# Patient Record
Sex: FEMALE | Race: WHITE | Employment: FULL TIME | URBAN - METROPOLITAN AREA
[De-identification: names, ages, dates, MRNs, and addresses within clinical notes are randomized per-mention and may not be internally consistent; named-entity substitution may affect disease eponyms.]

---

## 2022-06-30 RX ORDER — MESALAMINE 4 G/60ML
4 ENEMA RECTAL AS NEEDED
COMMUNITY

## 2022-06-30 NOTE — PROGRESS NOTES
[facility] ENDOSCOPY COLONOSCOPY PRE-OPERATIVE INSTRUCTIONS    Procedure date_07/07/2022________Arrival time___1300_________        Surgery time____1400________       Clear liquids the day before the procedure. Do not eat or drink anything within 5 hours of your procedure. This includes water chewing gum, mints and ice chips. You may brush your teeth and gargle the morning of your surgery, but do not swallow the water    You may be asked to stop blood thinners such as Coumadin, Plavix, Fragmin, Lovenox, etc., or any anti-inflammatories such as:  Aspirin, Ibuprofen, Advil, Naproxen prior to your procedure. We also ask that you stop any OTC medications such as fish oil, vitamin E, glucosamine, garlic, Multivitamins, COQ 10, etc.    You must make arrangements for a responsible adult to arrive with you and stay in our waiting area during your procedure. They will also need to take you home after your procedure. For your safety you will not be allowed to leave alone or drive yourself home. Also for your safety, it is strongly suggested that someone stay with you the first 24 hours after your procedure. For your comfort, please wear simple loose fitting clothing to the center. Please do not bring valuables. If you have a living will and a durable power of  for healthcare, please bring in a copy.      You will need to bring a photo ID and insurance card    Our goal is to provide you with excellent care so if you have any questions, please contact us at the [facility] at [phone]         Please note these are generalized instructions for all colonoscopy cases, you may be provided with more specific instructions if necessary

## 2022-07-06 ENCOUNTER — ANESTHESIA EVENT (OUTPATIENT)
Dept: ENDOSCOPY | Age: 42
End: 2022-07-06
Payer: COMMERCIAL

## 2022-07-07 ENCOUNTER — HOSPITAL ENCOUNTER (OUTPATIENT)
Age: 42
Setting detail: OUTPATIENT SURGERY
Discharge: HOME | End: 2022-07-07
Attending: INTERNAL MEDICINE | Admitting: INTERNAL MEDICINE
Payer: COMMERCIAL

## 2022-07-07 ENCOUNTER — ANESTHESIA (OUTPATIENT)
Dept: ENDOSCOPY | Age: 42
End: 2022-07-07
Payer: COMMERCIAL

## 2022-07-07 VITALS
WEIGHT: 118 LBS | OXYGEN SATURATION: 100 % | DIASTOLIC BLOOD PRESSURE: 67 MMHG | RESPIRATION RATE: 14 BRPM | TEMPERATURE: 98.8 F | HEIGHT: 61 IN | HEART RATE: 92 BPM | BODY MASS INDEX: 22.28 KG/M2 | SYSTOLIC BLOOD PRESSURE: 110 MMHG

## 2022-07-07 DIAGNOSIS — K51.90 ULCERATIVE COLITIS WITHOUT COMPLICATIONS, UNSPECIFIED LOCATION (HCC): ICD-10-CM

## 2022-07-07 LAB — PREGNANCY, URINE: NEGATIVE

## 2022-07-07 PROCEDURE — 3700000000 HC ANESTHESIA ATTENDED CARE: Performed by: INTERNAL MEDICINE

## 2022-07-07 PROCEDURE — 3700000001 HC ADD 15 MINUTES (ANESTHESIA): Performed by: INTERNAL MEDICINE

## 2022-07-07 PROCEDURE — 2580000003 HC RX 258: Performed by: STUDENT IN AN ORGANIZED HEALTH CARE EDUCATION/TRAINING PROGRAM

## 2022-07-07 PROCEDURE — 88342 IMHCHEM/IMCYTCHM 1ST ANTB: CPT

## 2022-07-07 PROCEDURE — 2709999900 HC NON-CHARGEABLE SUPPLY: Performed by: INTERNAL MEDICINE

## 2022-07-07 PROCEDURE — 84703 CHORIONIC GONADOTROPIN ASSAY: CPT

## 2022-07-07 PROCEDURE — 7100000010 HC PHASE II RECOVERY - FIRST 15 MIN: Performed by: INTERNAL MEDICINE

## 2022-07-07 PROCEDURE — 7100000011 HC PHASE II RECOVERY - ADDTL 15 MIN: Performed by: INTERNAL MEDICINE

## 2022-07-07 PROCEDURE — 6360000002 HC RX W HCPCS

## 2022-07-07 PROCEDURE — 2500000003 HC RX 250 WO HCPCS

## 2022-07-07 PROCEDURE — 88305 TISSUE EXAM BY PATHOLOGIST: CPT

## 2022-07-07 PROCEDURE — 3609010600 HC COLONOSCOPY POLYPECTOMY SNARE/COLD BIOPSY: Performed by: INTERNAL MEDICINE

## 2022-07-07 RX ORDER — SODIUM CHLORIDE 9 MG/ML
INJECTION, SOLUTION INTRAVENOUS CONTINUOUS
Status: DISCONTINUED | OUTPATIENT
Start: 2022-07-07 | End: 2022-07-07 | Stop reason: HOSPADM

## 2022-07-07 RX ORDER — SODIUM CHLORIDE 9 MG/ML
INJECTION, SOLUTION INTRAVENOUS PRN
Status: DISCONTINUED | OUTPATIENT
Start: 2022-07-07 | End: 2022-07-07 | Stop reason: HOSPADM

## 2022-07-07 RX ORDER — PROPOFOL 10 MG/ML
INJECTION, EMULSION INTRAVENOUS PRN
Status: DISCONTINUED | OUTPATIENT
Start: 2022-07-07 | End: 2022-07-07 | Stop reason: SDUPTHER

## 2022-07-07 RX ORDER — LIDOCAINE HYDROCHLORIDE 20 MG/ML
INJECTION, SOLUTION EPIDURAL; INFILTRATION; INTRACAUDAL; PERINEURAL PRN
Status: DISCONTINUED | OUTPATIENT
Start: 2022-07-07 | End: 2022-07-07 | Stop reason: SDUPTHER

## 2022-07-07 RX ORDER — SODIUM CHLORIDE 0.9 % (FLUSH) 0.9 %
5-40 SYRINGE (ML) INJECTION PRN
Status: DISCONTINUED | OUTPATIENT
Start: 2022-07-07 | End: 2022-07-07 | Stop reason: HOSPADM

## 2022-07-07 RX ORDER — SODIUM CHLORIDE 0.9 % (FLUSH) 0.9 %
5-40 SYRINGE (ML) INJECTION EVERY 12 HOURS SCHEDULED
Status: DISCONTINUED | OUTPATIENT
Start: 2022-07-07 | End: 2022-07-07 | Stop reason: HOSPADM

## 2022-07-07 RX ORDER — PHENYLEPHRINE HCL IN 0.9% NACL 1 MG/10 ML
SYRINGE (ML) INTRAVENOUS PRN
Status: DISCONTINUED | OUTPATIENT
Start: 2022-07-07 | End: 2022-07-07 | Stop reason: SDUPTHER

## 2022-07-07 RX ADMIN — PROPOFOL 40 MG: 10 INJECTION, EMULSION INTRAVENOUS at 14:42

## 2022-07-07 RX ADMIN — PROPOFOL 20 MG: 10 INJECTION, EMULSION INTRAVENOUS at 14:21

## 2022-07-07 RX ADMIN — PROPOFOL 20 MG: 10 INJECTION, EMULSION INTRAVENOUS at 14:25

## 2022-07-07 RX ADMIN — SODIUM CHLORIDE: 9 INJECTION, SOLUTION INTRAVENOUS at 13:26

## 2022-07-07 RX ADMIN — PROPOFOL 100 MG: 10 INJECTION, EMULSION INTRAVENOUS at 14:14

## 2022-07-07 RX ADMIN — PROPOFOL 40 MG: 10 INJECTION, EMULSION INTRAVENOUS at 14:34

## 2022-07-07 RX ADMIN — Medication 200 MCG: at 14:40

## 2022-07-07 RX ADMIN — Medication 200 MCG: at 14:32

## 2022-07-07 RX ADMIN — LIDOCAINE HYDROCHLORIDE 50 MG: 20 INJECTION, SOLUTION EPIDURAL; INFILTRATION; INTRACAUDAL; PERINEURAL at 14:14

## 2022-07-07 RX ADMIN — PROPOFOL 20 MG: 10 INJECTION, EMULSION INTRAVENOUS at 14:23

## 2022-07-07 RX ADMIN — PROPOFOL 20 MG: 10 INJECTION, EMULSION INTRAVENOUS at 14:28

## 2022-07-07 RX ADMIN — PROPOFOL 20 MG: 10 INJECTION, EMULSION INTRAVENOUS at 14:18

## 2022-07-07 ASSESSMENT — PAIN - FUNCTIONAL ASSESSMENT: PAIN_FUNCTIONAL_ASSESSMENT: NONE - DENIES PAIN

## 2022-07-07 ASSESSMENT — ENCOUNTER SYMPTOMS: SHORTNESS OF BREATH: 0

## 2022-07-07 ASSESSMENT — PAIN SCALES - WONG BAKER
WONGBAKER_NUMERICALRESPONSE: 0
WONGBAKER_NUMERICALRESPONSE: 0

## 2022-07-07 NOTE — ANESTHESIA POSTPROCEDURE EVALUATION
Department of Anesthesiology  Postprocedure Note    Patient: Rosemary Wiggins  MRN: [id number]  YOB: 1980  Date of evaluation: 7/7/2022      Procedure Summary     Date: 07/07/22 Room / Location: [address]    Anesthesia Start: 2625 Anesthesia Stop: 1446    Procedure: COLONOSCOPY POLYPECTOMY SNARE/COLD BIOPSY (N/A ) Diagnosis:       Ulcerative colitis without complications, unspecified location ([facility]      (Ulcerative colitis without complications)    Surgeons: Xin Dougherty MD Responsible Provider: Georgina Iyer MD    Anesthesia Type: MAC ASA Status: 2          Anesthesia Type: No value filed. Danni Phase I: Danni Score: 10    Danni Phase II: Danni Score: 5      Anesthesia Post Evaluation    Patient location during evaluation: PACU  Level of consciousness: awake and alert  Airway patency: patent  Nausea & Vomiting: no nausea and no vomiting  Complications: no  Cardiovascular status: blood pressure returned to baseline  Respiratory status: acceptable  Hydration status: euvolemic  Comments: Postoperative Anesthesia Note    Name:    Rosemary Wiggins  MRN:      [id number]    Patient Vitals in the past 12 hrs:  07/07/22 1448, BP:(!) 81/47, Temp:98.8 °F (37.1 °C), Temp src:Temporal, Pulse:99, Resp:16, SpO2:98 %  07/07/22 1325, Pulse:(!) 109  07/07/22 1322, BP:120/80, Temp:98.9 °F (37.2 °C), Temp src:Temporal, Pulse:(!) 126, Resp:16, SpO2:100 %, Height:5' 1\" (1.549 m), Weight:118 lb (53.5 kg)     LABS:    CBC  No results found for: WBC, HGB, HCT, PLT  RENAL  No results found for: NA, K, CL, CO2, BUN, CREATININE, GLUCOSE  COAGS  No results found for: PROTIME, INR, APTT    Intake & Output:  @78PVXP@    Nausea & Vomiting:  No    Level of Consciousness:  Awake    Pain Assessment:  Adequate analgesia    Anesthesia Complications:  No apparent anesthetic complications    SUMMARY      Vital signs stable  OK to discharge from Stage I post anesthesia care. Care transferred from Anesthesiology department on discharge from perioperative area

## 2022-07-07 NOTE — H&P
[address]   Pre-operative History and Physical    Patient: Chelsea Birmingham  : 1980  Acct#:     HISTORY OF PRESENT ILLNESS:    The patient is a 36 y.o. female who presents for longstanding history of ulcerative colitis diagnosed . Currently on Rowasa enemas every other day. Never been on biologic therapy. Indications: Ulcerative colitis. Past Medical History:        Diagnosis Date    [facility]       Past Surgical History:        Procedure Laterality Date    COLONOSCOPY      WISDOM TOOTH EXTRACTION        Medications Prior to Admission:   No current facility-administered medications on file prior to encounter. Current Outpatient Medications on File Prior to Encounter   Medication Sig Dispense Refill    mesalamine (ROWASA) 4 g enema Place 4 g rectally as needed          Allergies:  Penicillins    Social History:   Social History     Socioeconomic History    Marital status:      Spouse name: Not on file    Number of children: Not on file    Years of education: Not on file    Highest education level: Not on file   Occupational History    Not on file   Tobacco Use    Smoking status: Unknown If Ever Smoked    Smokeless tobacco: Not on file   Substance and Sexual Activity    Alcohol use: Not on file    Drug use: Not on file    Sexual activity: Not on file   Other Topics Concern    Not on file   Social History Narrative    Not on file     Social Determinants of Health     Financial Resource Strain:     Difficulty of Paying Living Expenses: Not on file   Food Insecurity:     Worried About Running Out of Food in the Last Year: Not on file    Armida of Food in the Last Year: Not on file   Transportation Needs:     Lack of Transportation (Medical): Not on file    Lack of Transportation (Non-Medical):  Not on file   Physical Activity:     Days of Exercise per Week: Not on file    Minutes of Exercise per Session: Not on file   Stress:     Feeling of Stress : Not on file   Social Connections:     Frequency of Communication with Friends and Family: Not on file    Frequency of Social Gatherings with Friends and Family: Not on file    Attends Anabaptist Services: Not on file    Active Member of Clubs or Organizations: Not on file    Attends Club or Organization Meetings: Not on file    Marital Status: Not on file   Intimate Partner Violence:     Fear of Current or Ex-Partner: Not on file    Emotionally Abused: Not on file    Physically Abused: Not on file    Sexually Abused: Not on file   Housing Stability:     Unable to Pay for Housing in the Last Year: Not on file    Number of [place] in the Last Year: Not on file    Unstable Housing in the Last Year: Not on file      Family History:   History reviewed. No pertinent family history. PHYSICAL EXAM:      /80   Pulse (!) 109   Temp 98.9 °F (37.2 °C) (Temporal)   Resp 16   Ht 5' 1\" (1.549 m)   Wt 118 lb (53.5 kg)   SpO2 100%   BMI 22.30 kg/m²  I        Heart:  Normal apical impulse, regular rate and rhythm, normal S1 and S2, no S3 or S4, and no murmur noted    Lungs:  No increased work of breathing, good air exchange, clear to auscultation bilaterally, no crackles or wheezing    Abdomen:  No scars, normal bowel sounds, soft, non-distended, non-tender, no masses palpated, no hepatosplenomegally      ASA Class  ASA 2 - Patient with mild systemic disease with no functional limitations    Mallampati Class: 3      ASSESSMENT AND PLAN:    1. Patient is a suitable candidate for endoscopic procedure and attendant anesthesia  2. Risks, benefits, alternatives of procedure discussed in detail with patient including risks of bleeding, infection, perforation, risks of sedation, risks of missed lesions. The patient wishes to proceed.

## 2022-07-07 NOTE — PROCEDURES
Colonoscopy Procedure  Note          Patient: Dev Ortega  : 1980      Procedure: Colonoscopy with cold snare polypectomy, biopsies    Date:  2022    Primary Care Physician: [id number]     Operative surgeon: Sreedhar Mcghee MD  Previous Colonoscopy: Yes  Consent: I explained and discussed the risk, benefits and alternatives for the procedure with the patient and obtained the patient's consent for the procedure. We discussed the specific risks including bleeding, perforation, post-procedure abdominal pain, and missed lesions which could lead to interval colorectal cancers. History:       Past Medical History:   Diagnosis Date    Ulcerative colitis ([facility]       Preoperative Diagnosis: Ulcerative colitis without complications  Post Operative Diagnosis: Ulcerative proctitis, cecal patch, colon polyp  ASA: 2  SEDATION: MAC      Procedures Performed: Colonoscopy   Scope Type: Pediatric    Procedure Details:      With the patient in left lateral decubitus position the endoscope was inserted through the anorectal area into the rectum. The scope was then advanced through the length of the colon to the cecum and terminal ileum. The quality of preparation was good. The scope was carefully withdrawn with careful inspection. Images were taken of the multiple segments of colon, cecum, IC valve, rectum, terminal ileum. Retroflexion was preformed in the rectum. Cecum Intubated: Yes  EBL: minimal to none  Complications:  no complications were noted  Post-operative Findings: Perianal exam unremarkable, digital rectal exam unremarkable, retroflexion the rectum did not demonstrate significant hemorrhoids    Upon entering the rectum there was Cordova 2 inflammation involving the entirety of the rectum.   Biopsies were taken to assess and to rule out dysplasia    The sigmoid descending transverse ascending colon were normal.  There was a cecal patch cordova class 1 inflammation which extended around the IC valve. In the cecum in close proximity to the IC valve there was a 7 mm polyp that was removed with cold snare resection interval complete. This did appear to be polyp with margins which were seen with NBI. The surrounding mucosa had minimal erythema and no signs of dysplasia. Biopsies were taken every 10 cm for dysplasia. Jars were  as cecum and ascending, transverse, descending and sigmoid, and rectum. Each segment was reviewed with narrowband imaging no obvious dysplastic lesions were seen. The terminal ileum was intubated was normal      Plan: Ulcerative proctitis majority of her colon is otherwise normal outside of a cecal patch. She has rather minimal symptoms. Although in comparison to her other colonoscopies from 2016 20 and 21 this appears to be slightly more significant disease. She is taking Rowasa enemas every other day. Fecal calprotectin was 152. We will consider adding canasa suppositories if she is not willing to do the enemas daily. Signed By: MD Ruth Bingham MD,   [address]   7/7/2022      Please note that some or all of this record was generated using voice recognition software. If there are any questions about the content of this document, please contact the author as some errors in translation may have occurred.

## 2022-07-07 NOTE — ANESTHESIA PRE PROCEDURE
[employer] Department of Anesthesiology  Pre-Anesthesia Evaluation/Consultation       Name:  Carlos Eli  : 1980  Age:  36 y.o. MRN:  [id number]  Date: 2022           Surgeon: Surgeon(s):  Cheko Sheth MD    Procedure: Procedure(s):  COLONOSCOPY DIAGNOSTIC     Allergies   Allergen Reactions    Penicillins Rash     There is no problem list on file for this patient. Past Medical History:   Diagnosis Date    Ulcerative colitis ([facility]      Past Surgical History:   Procedure Laterality Date    COLONOSCOPY      WISDOM TOOTH EXTRACTION       Social History     Tobacco Use    Smoking status: Unknown If Ever Smoked    Smokeless tobacco: Not on file   Substance Use Topics    Alcohol use: Not on file    Drug use: Not on file     Medications  No current facility-administered medications on file prior to encounter. Current Outpatient Medications on File Prior to Encounter   Medication Sig Dispense Refill    mesalamine (ROWASA) 4 g enema Place 4 g rectally as needed       No current facility-administered medications for this encounter. Current Outpatient Medications   Medication Sig Dispense Refill    mesalamine (ROWASA) 4 g enema Place 4 g rectally as needed       Vital Signs (Current)   Vitals:    22 0943   Weight: 120 lb (54.4 kg)   Height: 5' 1\" (1.549 m)                                            Vital Signs Statistics (for past 48 hrs)     No data recorded  BP Readings from Last 3 Encounters:   No data found for BP       BMI  Body mass index is 22.67 kg/m². Estimated body mass index is 22.67 kg/m² as calculated from the following:    Height as of this encounter: 5' 1\" (1.549 m). Weight as of this encounter: 120 lb (54.4 kg).     CBC No results found for: WBC, RBC, HGB, HCT, MCV, RDW, PLT  CMP  No results found for: NA, K, CL, CO2, BUN, CREATININE, GFRAA, AGRATIO, LABGLOM, GLUCOSE, GLU, PROT, CALCIUM, BILITOT, ALKPHOS, AST, ALT  BMP  No results found for: NA, K, CL, CO2, BUN, CREATININE, CALCIUM, GFRAA, LABGLOM, GLUCOSE, GLU  POCGlucose  No results for input(s): GLUCOSE in the last 72 hours. Coags  No results found for: PROTIME, INR, APTT  HCG (If Applicable) No results found for: PREGTESTUR, PREGSERUM, HCG, HCGQUANT   ABGs No results found for: PHART, PO2ART, QNS5YGK, FGB7VDU, BEART, M2SDBYSR   Type & Screen (If Applicable)  No results found for: LABABO, LABRH                         BMI: Wt Readings from Last 3 Encounters:       NPO Status:                          Anesthesia Evaluation  Patient summary reviewed and Nursing notes reviewed  Airway: Mallampati: II          Dental:          Pulmonary:       (-) COPD, asthma and shortness of breath                           Cardiovascular:        (-) hypertension, valvular problems/murmurs, past MI, CAD, CABG/stent, dysrhythmias,  angina and no hyperlipidemia                Neuro/Psych:      (-) seizures, TIA and CVA           GI/Hepatic/Renal:        (-) GERD, PUD, liver disease and no renal disease      ROS comment: UC.   Endo/Other:        (-) diabetes mellitus               Abdominal:             Vascular: negative vascular ROS. Other Findings:           Anesthesia Plan      MAC     ASA 2     (I discussed intravenous sedation to the patient's satisfaction including risks and alternatives. The patient agreed with the plan and has no further questions. Carter Jacob MD )  Induction: intravenous. Anesthetic plan and risks discussed with patient. Plan discussed with CRNA. This pre-anesthesia assessment may be used as a history and physical.    DOS STAFF ADDENDUM:    Pt seen and examined, chart reviewed (including anesthesia, drug and allergy history). No interval changes to history and physical examination. Anesthetic plan, risks, benefits, alternatives, and personnel involved discussed with patient.   Patient verbalized an understanding and agrees to proceed.       Gen Escobar MD  July 7, 2022  12:11 PM

## 2023-02-06 ENCOUNTER — HOSPITAL ENCOUNTER (OUTPATIENT)
Dept: PHYSICAL THERAPY | Age: 43
Setting detail: THERAPIES SERIES
Discharge: HOME | End: 2023-02-06
Payer: COMMERCIAL

## 2023-02-06 PROCEDURE — 97110 THERAPEUTIC EXERCISES: CPT

## 2023-02-06 PROCEDURE — 97161 PT EVAL LOW COMPLEX 20 MIN: CPT

## 2023-02-06 NOTE — PLAN OF CARE
Richar Chapman  Phone: [phone]   Fax:     [phone]                                                       Physical Therapy Certification    Dear Jesu Burdick MD,    We had the pleasure of evaluating the following patient for physical therapy services at [address] [place] [address]. A summary of our findings can be found in the initial assessment below. This includes our plan of care. If you have any questions or concerns regarding these findings, please do not hesitate to contact me at the office phone number checked above. Thank you for the referral.       Physician Signature:_______________________________Date:__________________  By signing above (or electronic signature), therapists plan is approved by physician      Patient: Maria Dolores Zavala   : 1980   MRN: [id number]  Referring Physician: Jesu Burdick MD      Evaluation Date: 2023     Medical Diagnosis Information:  Diagnosis: s/p R knee sx 2/3/2023   Treatment Diagnosis: M25.561, M25.661, M62.81                                         Insurance information: PT Insurance Information: [facility]; 20 visits/year; no auth; no ded; OOP not met     Precautions/ Contra-indications: s/p R ACLr w/ quad tendon autograft, DOS: 2/3/2023  Latex Allergy:  [x]NO      []YES  Preferred Language for Healthcare:   [x]English       []other:    C-SSRS Triggered by Intake questionnaire (Past 2 wk assessment ):   [x] No, Questionnaire did not trigger screening.   [] Yes, Patient intake triggered C-SSRS Screening      [] C-SSRS Screening completed  [] PCP notified via Epic     SUBJECTIVE: Patient stated complaint: Pt presents today for evaluation of R knee 3 days s/p R ACLr w/ quad tendon autograft on 2/3/2023.  Pt has been compliant w/ TTWB restrictions w/ brace locked in extension and use of axillary crutches and keeping compression brace. Aware of signs/symptoms of infection. Has been compliant w/ HEP given from doctor. Has familial support at home for transportation and transfers. Follow up on 2/16/2023. Pt denies previous injuries to R leg. Relevant Medical History:n/a  Functional Scale/Score: LEFS: 13     Pain Scale: 0-8/10  Easing factors: pain meds PRN (2x today); aspirin 1x/day   Provocative factors: sitting w/ leg straight      Type: []Constant   [x]Intermittent  []Radiating [x]Localized []other:     Numbness/Tingling: pt denies    Occupation/School: nurse- off work for 6 weeks    Living Status/Prior Level of Function: Independent with ADLs and IADLs    OBJECTIVE:     ROM LEFT RIGHT   HIP Flex     HIP Abd     HIP Ext     HIP IR     HIP ER     Knee ext 0 0-7   Knee Flex 140 90   Ankle PF     Ankle DF     Ankle In     Ankle Ev     Strength  LEFT RIGHT   HIP Flexors     HIP Abductors     HIP Ext     Hip ER     Knee EXT (quad)  trace   Knee Flex (HS)     Ankle DF     Ankle PF     Ankle Inv     Ankle EV          Circumference  Mid apex  7 cm prox             Reflexes/Sensation:    [x]Dermatomes/Myotomes intact    [x]Reflexes equal and normal bilaterally   []Other:    Joint mobility:    []Normal    [x]Hypo   []Hyper    Palpation: n/a    Functional Mobility/Transfers: n/a    Posture: WNL    Bandages/Dressings/Incisions: healing appropriately; no evidence of drainage or infection    Gait: (include devices/WB status) TTWB, brace locked in extension w/ crutches    Orthopedic Special Tests: n/a                       [x] Patient history, allergies, meds reviewed. Medical chart reviewed. See intake form. Review Of Systems (ROS):  [x]Performed Review of systems (Integumentary, CardioPulmonary, Neurological) by intake and observation. Intake form has been scanned into medical record.  Patient has been instructed to contact their primary care physician regarding ROS issues if not already being addressed at this time. Co-morbidities/Complexities (which will affect course of rehabilitation):   []None           Arthritic conditions   []Rheumatoid arthritis (M05.9)  []Osteoarthritis (M19.91)   Cardiovascular conditions   []Hypertension (I10)  []Hyperlipidemia (E78.5)  []Angina pectoris (I20)  []Atherosclerosis (I70)  []CVA Musculoskeletal conditions   []Disc pathology   []Congenital spine pathologies   []Prior surgical intervention  []Osteoporosis (M81.8)  []Osteopenia (M85.8)   Endocrine conditions   []Hypothyroid (E03.9)  []Hyperthyroid Gastrointestinal conditions   []Constipation (E98.34)   Metabolic conditions   []Morbid obesity (E66.01)  []Diabetes type 1(E10.65) or 2 (E11.65)   []Neuropathy (G60.9)     Pulmonary conditions   []Asthma (J45)  []Coughing   []COPD (J44.9)   Psychological Disorders  []Anxiety (F41.9)  []Depression (F32.9)   []Other:   []Other:          Barriers to/and or personal factors that will affect rehab potential:              []Age  []Sex    []Smoker              []Motivation/Lack of Motivation                        []Co-Morbidities              []Cognitive Function, education/learning barriers              []Environmental, home barriers              []profession/work barriers  []past PT/medical experience  []other:  Justification:     Falls Risk Assessment (30 days):   [x] Falls Risk assessed and no intervention required. [] Falls Risk assessed and Patient requires intervention due to being higher risk   TUG score (>12s at risk):     [] Falls education provided, including         ASSESSMENT: Focus on ROM for 0-120 deg by 4-6 weeks postop. Progress weightbearing 25%/week as appropriate. Modalities and exercises targeting full independence w/ quad recruitment for safe progression of weight bearing and strengthening.   Functional Impairments:     [x]Noted lumbar/proximal hip/LE hypomobility   [x]Decreased LE functional ROM   [x]Decreased core/proximal hip strength and neuromuscular control   [x]Decreased LE functional strength   [x]Reduced balance/proprioceptive control   []other:      Functional Activity Limitations (from functional questionnaire and intake)   []Reduced ability to tolerate prolonged functional positions   [x]Reduced ability or difficulty with changes of positions or transfers between positions   [x]Reduced ability to maintain good posture and demonstrate good body mechanics with sitting, bending, and lifting   []Reduced ability to sleep   [x] Reduced ability or tolerance with driving and/or computer work   [x]Reduced ability to perform lifting, carrying tasks   [x]Reduced ability to squat   []Reduced ability to forward bend   [x]Reduced ability to ambulate prolonged functional periods/distances/surfaces   [x]Reduced ability to ascend/descend stairs   [x]Reduced ability to run, hop or jump   []other:     Participation Restrictions   [x]Reduced participation in self care activities   [x]Reduced participation in home management activities   [x]Reduced participation in work activities   []Reduced participation in social activities. [x]Reduced participation in sport activities. Classification :    [x]Signs/symptoms consistent with post-surgical status including decreased ROM, strength and function.    []Signs/symptoms consistent with joint sprain/strain   []Signs/symptoms consistent with patella-femoral syndrome   []Signs/symptoms consistent with knee OA/hip OA   []Signs/symptoms consistent with internal derangement of knee/Hip   []Signs/symptoms consistent with functional hip weakness/NMR control      []Signs/symptoms consistent with tendinitis/tendinosis    []signs/symptoms consistent with pathology which may benefit from Dry needling      []other:      Prognosis/Rehab Potential:      [x]Excellent   []Good    []Fair   []Poor    Tolerance of evaluation/treatment:    [x]Excellent   []Good    []Fair   []Poor    Physical Therapy Evaluation Complexity Justification  [x] A history of present problem with:  [x] no personal factors and/or comorbidities that impact the plan of care;  []1-2 personal factors and/or comorbidities that impact the plan of care  []3 personal factors and/or comorbidities that impact the plan of care  [x] An examination of body systems using standardized tests and measures addressing any of the following: body structures and functions (impairments), activity limitations, and/or participation restrictions;:  [x] a total of 1-2 or more elements   [] a total of 3 or more elements   [] a total of 4 or more elements   [x] A clinical presentation with:  [x] stable and/or uncomplicated characteristics   [] evolving clinical presentation with changing characteristics  [] unstable and unpredictable characteristics;   [x] Clinical decision making of [x] low, [] moderate, [] high complexity using standardized patient assessment instrument and/or measurable assessment of functional outcome. [x] EVAL (LOW) 17922 (typically 20 minutes face-to-face)  [] EVAL (MOD) 47685 (typically 30 minutes face-to-face)  [] EVAL (HIGH) 82095 (typically 45 minutes face-to-face)  [] RE-EVAL     PLAN:  Frequency/Duration:  2 days per week for 8 Weeks:  Interventions:  [x]  Therapeutic exercise including: strength training, ROM, for Lower extremity and core   [x]  NMR activation and proprioception for LE, Glutes and Core   [x]  Manual therapy as indicated for LE, Hip and spine to include: Dry Needling/IASTM, STM, PROM, Gr I-IV mobilizations, manipulation. [x] Modalities as needed that may include: thermal agents, E-stim, Biofeedback, US, iontophoresis as indicated  [x] Patient education on joint protection, postural re-education, activity modification, progression of HEP.     HEP instruction: (see scanned forms)    GOALS:  Patient stated goal: \"get back to working out, skiing, working\"  [] Progressing: [] Met: [x] Not Met: [] Adjusted    Therapist goals for Patient: Short Term Goals: To be achieved in: 2 weeks  1. Independent in HEP and progression per patient tolerance, in order to prevent re-injury. [] Progressing: [] Met: [x] Not Met: [] Adjusted  2. Patient will have a decrease in pain to facilitate improvement in movement, function, and ADLs as indicated by Functional Deficits. [] Progressing: [] Met: [x] Not Met: [] Adjusted    Long Term Goals: To be achieved in: 12 weeks  1. Disability index score of 70 or higher for the LEFS to assist with reaching prior level of function. [] Progressing: [] Met: [x] Not Met: [] Adjusted  2. Patient will demonstrate increased AROM to full R knee flexion and extension to allow for proper joint functioning as indicated by patients Functional Deficits. [] Progressing: [] Met: [x] Not Met: [] Adjusted  3. Patient will demonstrate an increase in Strength to good proximal hip strength and control, within 5lb HHD in LE to allow for proper functional mobility as indicated by patients Functional Deficits. [] Progressing: [] Met: [x] Not Met: [] Adjusted  4. Patient will return to walking without increased symptoms or restrictions and without functional activities without increased symptoms or restriction. [] Progressing: [] Met: [x] Not Met: [] Adjusted  5. Pt will be able to return to work without increased symptoms or duty restrictions.   [] Progressing: [] Met: [x] Not Met: [] Adjusted     Electronically signed by:  Haseeb Montes, PT

## 2023-02-07 NOTE — FLOWSHEET NOTE
[facility]  [address] [place] Howell  Phone: [phone] Fax: [phone]    Physical Therapy Treatment Note/ Progress Report:       Date:  2023    Patient Name:  Hal Andrew    :  1980  MRN: [id number]  Restrictions/Precautions:    Medical/Treatment Diagnosis Information:  Diagnosis: s/p R knee sx 2/3/2023  Treatment Diagnosis: M25.561, M25.661, Z25.09  Insurance/Certification information:  PT Insurance Information: [facility]; 20 visits/year; no auth; no ded; OOP not met  Physician Information:  Lindsey Pretty MD  Plan of care signed (Y/N):     Date of Patient follow up with Physician:      Progress Report: []  Yes  [x]  No     Date Range for reporting period:  Beginnin2023  Ending: 10 visits or 30 days    Progress report due (10 Rx/or 30 days whichever is less): 2664     Recertification due (POC duration/ or 90 days whichever is less): 3/8/2023     Visit # Insurance Allowable Auth Needed   1 20 []Yes    [x]No     Latex Allergy:  [x]NO      []YES  Preferred Language for Healthcare:   [x]English       []other:  Functional Scale: LEFS:    Date assessed:2023    Pain level:  0-8/10     SUBJECTIVE:  See eval    OBJECTIVE: See eval  Observation:   Test measurements:      RESTRICTIONS/PRECAUTIONS:  s/p R ACLr w/ quad tendon autograft, DOS: 2/3/2023    Exercises/Interventions:     Therapeutic Ex (89419)   Min: 25 Sets/sec Reps Notes/CUES   Retro Stepper/BIKE      Seated HS stretch 30'' 3    Strap calf stretch 30'' 3    Quad set 10'' 10    Heel slide 10'' 10    Heel prop 3'     Alter G      BFR      Sportcord March      3 way SLR      SAQ      Clam ABD      Hip Ext Polly Life      BOSU fwd/side lunge      BOSU squat      Leg Press Iso/Con/Ecc 0-      Cybex HS curl      TKE      Glute side walks      RDL      Slide Lunge      Slide HS eccentrics      Step ups/ecc step down      Swissball wall rolls- in SLS- hip drive      Quad hip ext/wall-ball rolls                  Manual Intervention (45296)  Min:      Knee mobs/PROM 6'     Tib/Fem Mobs      Patella Mobs 3'  gentle   Ankle mobs                  NMR re-education (46658)  Min:   CUES NEEDED   Slovak/Biofeedback 10/10 8'  Quad set; 2 channel Gabonese stim   BFR      G. Med activation      Hip Ext full ROM/ G. Activation      Bosu Bal and Prop- G Med      Single leg stance/Balance/Prop      Bosu Retro G. Med act      Prone Hip froggers- sliders/elevated            Therapeutic Activity (12661)  Min:      Ladders      Plyos      Dynamic Balance                            Therapeutic Exercise and NMR EXR  [x] (66469) Provided verbal/tactile cueing for activities related to strengthening, flexibility, endurance, ROM for improvements in LE, proximal hip, and core control with self care, mobility, lifting, ambulation. [x] (20758) Provided verbal/tactile cueing for activities related to improving balance, coordination, kinesthetic sense, posture, motor skill, proprioception  to assist with LE, proximal hip, and core control in self care, mobility, lifting, ambulation and eccentric single leg control.      NMR and Therapeutic Activities:    [x] (56644 or 15428) Provided verbal/tactile cueing for activities related to improving balance, coordination, kinesthetic sense, posture, motor skill, proprioception and motor activation to allow for proper function of core, proximal hip and LE with self care and ADLs and functional mobility.   [] (94055) Gait Re-education- Provided training and instruction to the patient for proper LE, core and proximal hip recruitment and positioning and eccentric body weight control with ambulation re-education including up and down stairs     Home Exercise Program:    [x] (65718) Reviewed/Progressed HEP activities related to strengthening, flexibility, endurance, ROM of core, proximal hip and LE for functional self-care, mobility, lifting and ambulation/stair navigation   [] (78338)Reviewed/Progressed HEP activities related to improving balance, coordination, kinesthetic sense, posture, motor skill, proprioception of core, proximal hip and LE for self care, mobility, lifting, and ambulation/stair navigation      Manual Treatments:  PROM / STM / Oscillations-Mobs:  G-I, II, III, IV (PA's, Inf., Post.)  [x] (17432) Provided manual therapy to mobilize LE, proximal hip and/or LS spine soft tissue/joints for the purpose of modulating pain, promoting relaxation,  increasing ROM, reducing/eliminating soft tissue swelling/inflammation/restriction, improving soft tissue extensibility and allowing for proper ROM for normal function with self care, mobility, lifting and ambulation. Modalities:     [x] GAME READY (VASO)- for significant edema, swelling, pain control. Charges:  Timed Code Treatment Minutes: 30   Total Treatment Minutes: 60      [x] EVAL (LOW) 37617 (typically 20 minutes face-to-face)  [] EVAL (MOD) 59214 (typically 30 minutes face-to-face)  [] EVAL (HIGH) 04096 (typically 45 minutes face-to-face)  [] RE-EVAL     [x] HL(72422) x  1   [] DRY NEEDLE 1 OR 2 MUSCLES  [] NMR (28680) x     [] DRY NEEDLE 3+ MUSCLES  [] Manual (82179) x       [] TA (98872) x     [] Mech Traction (61252)  [] ES(attended) (74841)     [] ES (un) (39337):   [] VASO (00165)  [] Other:      GOALS:  Patient stated goal: \"get back to working out, skiing, working\"  [] Progressing: [] Met: [x] Not Met: [] Adjusted    Therapist goals for Patient:   Short Term Goals: To be achieved in: 2 weeks  1. Independent in HEP and progression per patient tolerance, in order to prevent re-injury. [] Progressing: [] Met: [x] Not Met: [] Adjusted  2. Patient will have a decrease in pain to facilitate improvement in movement, function, and ADLs as indicated by Functional Deficits. [] Progressing: [] Met: [x] Not Met: [] Adjusted    Long Term Goals: To be achieved in: 12 weeks  1.  Disability index score of 70 or higher for the LEFS to assist with reaching prior level of function. [] Progressing: [] Met: [x] Not Met: [] Adjusted  2. Patient will demonstrate increased AROM to full R knee flexion and extension to allow for proper joint functioning as indicated by patients Functional Deficits. [] Progressing: [] Met: [x] Not Met: [] Adjusted  3. Patient will demonstrate an increase in Strength to good proximal hip strength and control, within 5lb HHD in LE to allow for proper functional mobility as indicated by patients Functional Deficits. [] Progressing: [] Met: [x] Not Met: [] Adjusted  4. Patient will return to walking without increased symptoms or restrictions and without functional activities without increased symptoms or restriction. [] Progressing: [] Met: [x] Not Met: [] Adjusted  5. Pt will be able to return to work without increased symptoms or duty restrictions. [] Progressing: [] Met: [x] Not Met: [] Adjusted     ASSESSMENT:  See eval    Return to Play: (if applicable)   [x]  Stage 1: Intro to Strength   []  Stage 2: Return to Run and Strength   []  Stage 3: Return to Jump and Strength   []  Stage 4: Dynamic Strength and Agility   []  Stage 5: Sport Specific Training     []  Ready to Return to Play, Meets All Above Stages   []  Not Ready for Return to Sports   Comments:            Treatment/Activity Tolerance:  [x] Patient tolerated treatment well [] Patient limited by fatique  [] Patient limited by pain  [] Patient limited by other medical complications  [] Other:     Overall Progression Towards Functional goals/ Treatment Progress Update:  [] Patient is progressing as expected towards functional goals listed. [] Progression is slowed due to complexities/Impairments listed. [] Progression has been slowed due to co-morbidities.   [x] Plan just implemented, too soon to assess goals progression <30days   [] Goals require adjustment due to lack of progress  [] Patient is not progressing as expected and requires additional follow up with physician  [] Other    Prognosis for POC: [x] Good [] Fair  [] Poor    Patient requires continued skilled intervention: [x] Yes  [] No        PLAN: See eval  [] Continue per plan of care [] Alter current plan (see comments)  [x] Plan of care initiated [] Hold pending MD visit [] Discharge    Electronically signed by: Rafal Woo PT    Note: If patient does not return for scheduled/recommended follow up visits, this note will serve as a discharge from care along with the most recent update on progress.

## 2023-02-09 ENCOUNTER — HOSPITAL ENCOUNTER (OUTPATIENT)
Dept: PHYSICAL THERAPY | Age: 43
Setting detail: THERAPIES SERIES
Discharge: HOME | End: 2023-02-09
Payer: COMMERCIAL

## 2023-02-09 PROCEDURE — 97112 NEUROMUSCULAR REEDUCATION: CPT

## 2023-02-09 PROCEDURE — 97110 THERAPEUTIC EXERCISES: CPT

## 2023-02-09 PROCEDURE — 97140 MANUAL THERAPY 1/> REGIONS: CPT

## 2023-02-09 NOTE — FLOWSHEET NOTE
[place] [address] [facility] [address]  Phone: [phone] Fax: [phone]    Physical Therapy Treatment Note/ Progress Report:       Date:  2023    Patient Name:  Ivette Nguyen    :  1980  MRN: [id number]  Restrictions/Precautions:    Medical/Treatment Diagnosis Information:  Diagnosis: s/p R knee sx 2/3/2023  Treatment Diagnosis: M25.561, M25.661, C20.92  Insurance/Certification information:  PT Insurance Information: [facility]; 20 visits/year; no auth; no ded; OOP not met  Physician Information:  Jennifer Reed MD  Plan of care signed (Y/N):     Date of Patient follow up with Physician:      Progress Report: []  Yes  [x]  No     Date Range for reporting period:  Beginnin2023  Ending: 10 visits or 30 days    Progress report due (10 Rx/or 30 days whichever is less): 336     Recertification due (POC duration/ or 90 days whichever is less): 3/8/2023     Visit # Insurance Allowable Auth Needed   2 20 []Yes    [x]No     Latex Allergy:  [x]NO      []YES  Preferred Language for Healthcare:   [x]English       []other:  Functional Scale: LEFS:    Date assessed:2023    Pain level:  0-6/10     SUBJECTIVE:  Pt reports that she was up too much yesterday and is feeling a little more sore from that. Pt notes having difficulty getting her quad to fire with quad sets. HEP is going well.        OBJECTIVE: See eval  Observation:   Test measurements:      RESTRICTIONS/PRECAUTIONS:  s/p R ACLr w/ quad tendon autograft, DOS: 2/3/2023    Exercises/Interventions:     Therapeutic Ex (13130)   Min: 20 Sets/sec Reps Notes/CUES   Retro Stepper/BIKE      Seated HS stretch 30'' 3    Strap calf stretch 30'' 3    Quad set 10'' 10 Into towel roll   Heel slide 10'' 10    Heel prop 3'     Alter G      BFR      Sportcord March      3 way SLR      SAQ      Clam ABD      Hip Ext Eduarda Андрей      BOSU fwd/side lunge      BOSU squat      Leg Press Iso/Con/Ecc 0-      Cybex HS curl      TKE      Glute side walks      RDL      Slide Lunge      Slide HS eccentrics      Step ups/ecc step down      Swissball wall rolls- in SLS- hip drive      Quad hip ext/wall-ball rolls                  Manual Intervention (90639)  Min: 11      Knee mobs/PROM 4'     Tib/Fem Mobs      Patella Mobs 3'  gentle   Ankle mobs      STM 4  Proximal lateral calf          NMR re-education (42882)  Min: 10 min   CUES NEEDED   VMS burst 10/10 8'  Quad set with Pt in long-sitting   BFR      G. Med activation      Hip Ext full ROM/ G. Activation      Bosu Bal and Prop- G Med      Single leg stance/Balance/Prop      Bosu Retro G. Med act      Prone Hip froggers- sliders/elevated            Therapeutic Activity (79589)  Min:      Ladders      Plyos      Dynamic Balance                            Therapeutic Exercise and NMR EXR  [x] (98259) Provided verbal/tactile cueing for activities related to strengthening, flexibility, endurance, ROM for improvements in LE, proximal hip, and core control with self care, mobility, lifting, ambulation. [x] (58895) Provided verbal/tactile cueing for activities related to improving balance, coordination, kinesthetic sense, posture, motor skill, proprioception  to assist with LE, proximal hip, and core control in self care, mobility, lifting, ambulation and eccentric single leg control.      NMR and Therapeutic Activities:    [x] (01139 or 58716) Provided verbal/tactile cueing for activities related to improving balance, coordination, kinesthetic sense, posture, motor skill, proprioception and motor activation to allow for proper function of core, proximal hip and LE with self care and ADLs and functional mobility.   [] (11559) Gait Re-education- Provided training and instruction to the patient for proper LE, core and proximal hip recruitment and positioning and eccentric body weight control with ambulation re-education including up and down stairs Home Exercise Program:    [x] (94869) Reviewed/Progressed HEP activities related to strengthening, flexibility, endurance, ROM of core, proximal hip and LE for functional self-care, mobility, lifting and ambulation/stair navigation   [] (58632)Reviewed/Progressed HEP activities related to improving balance, coordination, kinesthetic sense, posture, motor skill, proprioception of core, proximal hip and LE for self care, mobility, lifting, and ambulation/stair navigation      Manual Treatments:  PROM / STM / Oscillations-Mobs:  G-I, II, III, IV (PA's, Inf., Post.)  [x] (19764) Provided manual therapy to mobilize LE, proximal hip and/or LS spine soft tissue/joints for the purpose of modulating pain, promoting relaxation,  increasing ROM, reducing/eliminating soft tissue swelling/inflammation/restriction, improving soft tissue extensibility and allowing for proper ROM for normal function with self care, mobility, lifting and ambulation. Modalities:     [x] GAME READY (VASO)- for significant edema, swelling, pain control x 10 min @ conclusion with pillowcase barrier placed and LE elevated on 1 pillow. Charges:  Timed Code Treatment Minutes: 41   Total Treatment Minutes: 51      [] EVAL (LOW) 52929 (typically 20 minutes face-to-face)  [] EVAL (MOD) 33142 (typically 30 minutes face-to-face)  [] EVAL (HIGH) 24483 (typically 45 minutes face-to-face)  [] RE-EVAL     [x] ZN(04604) x  1   [] DRY NEEDLE 1 OR 2 MUSCLES  [x] NMR (25966) x 1    [] DRY NEEDLE 3+ MUSCLES  [x] Manual (23764) x 1      [] TA (70658) x     [] [facility] (37102)  [] ES(attended) (49691)     [] ES (un) (65864):   [] VASO (95645)  [] Other:      GOALS:  Patient stated goal: \"get back to working out, skiing, working\"  [] Progressing: [] Met: [x] Not Met: [] Adjusted    Therapist goals for Patient:   Short Term Goals: To be achieved in: 2 weeks  1. Independent in HEP and progression per patient tolerance, in order to prevent re-injury.    [] Progressing: [] Met: [x] Not Met: [] Adjusted  2. Patient will have a decrease in pain to facilitate improvement in movement, function, and ADLs as indicated by Functional Deficits. [] Progressing: [] Met: [x] Not Met: [] Adjusted    Long Term Goals: To be achieved in: 12 weeks  1. Disability index score of 70 or higher for the LEFS to assist with reaching prior level of function. [] Progressing: [] Met: [x] Not Met: [] Adjusted  2. Patient will demonstrate increased AROM to full R knee flexion and extension to allow for proper joint functioning as indicated by patients Functional Deficits. [] Progressing: [] Met: [x] Not Met: [] Adjusted  3. Patient will demonstrate an increase in Strength to good proximal hip strength and control, within 5lb HHD in LE to allow for proper functional mobility as indicated by patients Functional Deficits. [] Progressing: [] Met: [x] Not Met: [] Adjusted  4. Patient will return to walking without increased symptoms or restrictions and without functional activities without increased symptoms or restriction. [] Progressing: [] Met: [x] Not Met: [] Adjusted  5. Pt will be able to return to work without increased symptoms or duty restrictions. [] Progressing: [] Met: [x] Not Met: [] Adjusted     ASSESSMENT:  Good session. Pain well controlled for Pt not yet being a full week out of surgery. Quad activation needs work as expected with acute phase after surgery. Swelling is fairly well controlled. ROM doing well per protocol.      Return to Play: (if applicable)   [x]  Stage 1: Intro to Strength   []  Stage 2: Return to Run and Strength   []  Stage 3: Return to Jump and Strength   []  Stage 4: Dynamic Strength and Agility   []  Stage 5: Sport Specific Training     []  Ready to Return to Play, Meets All Above Stages   []  Not Ready for Return to Sports   Comments:            Treatment/Activity Tolerance:  [x] Patient tolerated treatment well [] Patient limited by ivania  [] Patient limited by pain  [] Patient limited by other medical complications  [] Other:     Overall Progression Towards Functional goals/ Treatment Progress Update:  [] Patient is progressing as expected towards functional goals listed. [] Progression is slowed due to complexities/Impairments listed. [] Progression has been slowed due to co-morbidities. [x] Plan just implemented, too soon to assess goals progression <30days   [] Goals require adjustment due to lack of progress  [] Patient is not progressing as expected and requires additional follow up with physician  [] Other    Prognosis for POC: [x] Good [] Fair  [] Poor    Patient requires continued skilled intervention: [x] Yes  [] No        PLAN: See eval  [x] Continue per plan of care [] Alter current plan (see comments)  [] Plan of care initiated [] Hold pending MD visit [] Discharge    Electronically signed by: Carlos Harding PTA    Note: If patient does not return for scheduled/recommended follow up visits, this note will serve as a discharge from care along with the most recent update on progress.

## 2023-02-14 ENCOUNTER — HOSPITAL ENCOUNTER (OUTPATIENT)
Dept: PHYSICAL THERAPY | Age: 43
Setting detail: THERAPIES SERIES
Discharge: HOME | End: 2023-02-14
Payer: COMMERCIAL

## 2023-02-14 PROCEDURE — 97016 VASOPNEUMATIC DEVICE THERAPY: CPT

## 2023-02-14 PROCEDURE — 97112 NEUROMUSCULAR REEDUCATION: CPT

## 2023-02-14 PROCEDURE — 97110 THERAPEUTIC EXERCISES: CPT

## 2023-02-14 NOTE — FLOWSHEET NOTE
[place] [address] [facility] [address]  Phone: [phone] Fax: [phone]    Physical Therapy Treatment Note/ Progress Report:       Date:  2023    Patient Name:  Eligio Rick    :  1980  MRN: [id number]  Restrictions/Precautions:    Medical/Treatment Diagnosis Information:  Diagnosis: s/p R knee sx 2/3/2023  Treatment Diagnosis: M25.561, M25.661, R92.81  Insurance/Certification information:  PT Insurance Information: [facility]; 20 visits/year; no auth; no ded; OOP not met  Physician Information:  Igor Tariq MD  Plan of care signed (Y/N):     Date of Patient follow up with Physician:      Progress Report: []  Yes  [x]  No     Date Range for reporting period:  Beginnin2023  Ending: 10 visits or 30 days    Progress report due (10 Rx/or 30 days whichever is less):      Recertification due (POC duration/ or 90 days whichever is less): 3/8/2023     Visit # Insurance Allowable Auth Needed   3 20 []Yes    [x]No     Latex Allergy:  [x]NO      []YES  Preferred Language for Healthcare:   [x]English       []other:  Functional Scale: LEFS:    Date assessed:2023    Pain level:  0-6/10     SUBJECTIVE:  Pt reports knee is better than last visit. States she has a hard time getting comfortable when having to stay in prolonged positions and sleeping.     OBJECTIVE:   Observation:   Test measurements:      23  ROM: 0-100 deg      RESTRICTIONS/PRECAUTIONS:  s/p R ACLr w/ quad tendon autograft, DOS: 2/3/2023    Exercises/Interventions:     Therapeutic Ex (05089)   Min: 35 Sets/sec Reps Notes/CUES   Retro Stepper/BIKE 5'  Partial to full revolutions   Seated HS stretch 30'' 3    Strap calf stretch 30'' 3    Quad set 10'' 10 Into towel roll   Heel slide 10'' 10    Heel prop      Alter G      BFR      Sportco      3 way SLR 1 10 Mod A from PT   SAQ      Clam ABD      Hip Ext Lorette Shouts      BOSU fwd/side lunge      BOSU squat      Leg Press Iso/Con/Ecc 0- 10'' 10 Iso at 40 deg   Cybex HS curl      TKE 5'' 15 3 pl CC   Glute side walks      RDL      Slide Lunge      Slide HS eccentrics      Step ups/ecc step down      Swissball wall rolls- in SLS- hip drive      Quad hip ext/wall-ball rolls                  Manual Intervention (75132)  Min: 4      Knee mobs/PROM      Tib/Fem Mobs      Patella Mobs   gentle   Ankle mobs      STM 4  Proximal lateral calf          NMR re-education (67007)  Min: 10 min   CUES NEEDED   VMS burst 10/10 8'  Quad set with Pt in long-sitting   BFR      G. Med activation      Hip Ext full ROM/ G. Activation      Bosu Bal and Prop- G Med      Single leg stance/Balance/Prop      Bosu Retro G. Med act      Prone Hip froggers- sliders/elevated            Therapeutic Activity (93323)  Min:      Ladders      Plyos      Dynamic Balance                            Therapeutic Exercise and NMR EXR  [x] (04771) Provided verbal/tactile cueing for activities related to strengthening, flexibility, endurance, ROM for improvements in LE, proximal hip, and core control with self care, mobility, lifting, ambulation. [x] (97934) Provided verbal/tactile cueing for activities related to improving balance, coordination, kinesthetic sense, posture, motor skill, proprioception  to assist with LE, proximal hip, and core control in self care, mobility, lifting, ambulation and eccentric single leg control.      NMR and Therapeutic Activities:    [x] (91165 or 00361) Provided verbal/tactile cueing for activities related to improving balance, coordination, kinesthetic sense, posture, motor skill, proprioception and motor activation to allow for proper function of core, proximal hip and LE with self care and ADLs and functional mobility.   [] (73805) Gait Re-education- Provided training and instruction to the patient for proper LE, core and proximal hip recruitment and positioning and eccentric body weight control with ambulation re-education including up and down stairs     Home Exercise Program:    [x] (63006) Reviewed/Progressed HEP activities related to strengthening, flexibility, endurance, ROM of core, proximal hip and LE for functional self-care, mobility, lifting and ambulation/stair navigation   [] (78644)Reviewed/Progressed HEP activities related to improving balance, coordination, kinesthetic sense, posture, motor skill, proprioception of core, proximal hip and LE for self care, mobility, lifting, and ambulation/stair navigation      Manual Treatments:  PROM / STM / Oscillations-Mobs:  G-I, II, III, IV (PA's, Inf., Post.)  [x] (01447) Provided manual therapy to mobilize LE, proximal hip and/or LS spine soft tissue/joints for the purpose of modulating pain, promoting relaxation,  increasing ROM, reducing/eliminating soft tissue swelling/inflammation/restriction, improving soft tissue extensibility and allowing for proper ROM for normal function with self care, mobility, lifting and ambulation. Modalities:     [x] GAME READY (VASO)- for significant edema, swelling, pain control x 10 min @ conclusion with pillowcase barrier placed and LE elevated on 1 pillow. Charges:  Timed Code Treatment Minutes: 51   Total Treatment Minutes: 61      [] EVAL (LOW) 83370 (typically 20 minutes face-to-face)  [] EVAL (MOD) 82878 (typically 30 minutes face-to-face)  [] EVAL (HIGH) 75060 (typically 45 minutes face-to-face)  [] RE-EVAL     [x] IG(30422) x  2   [] DRY NEEDLE 1 OR 2 MUSCLES  [x] NMR (61409) x 1    [] DRY NEEDLE 3+ MUSCLES  [] Manual (92106) x       [] TA (93317) x     [] Mech Traction (85921)  [] ES(attended) (79997)     [] ES (un) (77854):   [x] VASO (26270)  [] Other:      GOALS:  Patient stated goal: \"get back to working out, skiing, working\"  [] Progressing: [] Met: [x] Not Met: [] Adjusted    Therapist goals for Patient:   Short Term Goals: To be achieved in: 2 weeks  1.  Independent in HEP and progression per patient tolerance, in order to prevent re-injury. [] Progressing: [] Met: [x] Not Met: [] Adjusted  2. Patient will have a decrease in pain to facilitate improvement in movement, function, and ADLs as indicated by Functional Deficits. [] Progressing: [] Met: [x] Not Met: [] Adjusted    Long Term Goals: To be achieved in: 12 weeks  1. Disability index score of 70 or higher for the LEFS to assist with reaching prior level of function. [] Progressing: [] Met: [x] Not Met: [] Adjusted  2. Patient will demonstrate increased AROM to full R knee flexion and extension to allow for proper joint functioning as indicated by patients Functional Deficits. [] Progressing: [] Met: [x] Not Met: [] Adjusted  3. Patient will demonstrate an increase in Strength to good proximal hip strength and control, within 5lb HHD in LE to allow for proper functional mobility as indicated by patients Functional Deficits. [] Progressing: [] Met: [x] Not Met: [] Adjusted  4. Patient will return to walking without increased symptoms or restrictions and without functional activities without increased symptoms or restriction. [] Progressing: [] Met: [x] Not Met: [] Adjusted  5. Pt will be able to return to work without increased symptoms or duty restrictions. [] Progressing: [] Met: [x] Not Met: [] Adjusted     ASSESSMENT:  Good session. Pain well controlled for Pt not yet being a full week out of surgery. Quad activation needs work as expected with acute phase after surgery. Some swelling today benefits from vaso today. ROM doing well per protocol, 0-100 deg today; Progressed WB w/ brace locked to 25-50% working on quad at heel strike. Continue to progress as tolerated.     Return to Play: (if applicable)   [x]  Stage 1: Intro to Strength   []  Stage 2: Return to Run and Strength   []  Stage 3: Return to Jump and Strength   []  Stage 4: Dynamic Strength and Agility   []  Stage 5: Sport Specific Training     []  Ready to Return to Play, Meets All Above Stages   []  Not Ready for Return to Sports   Comments:            Treatment/Activity Tolerance:  [x] Patient tolerated treatment well [] Patient limited by fatique  [] Patient limited by pain  [] Patient limited by other medical complications  [] Other:     Overall Progression Towards Functional goals/ Treatment Progress Update:  [] Patient is progressing as expected towards functional goals listed. [] Progression is slowed due to complexities/Impairments listed. [] Progression has been slowed due to co-morbidities. [x] Plan just implemented, too soon to assess goals progression <30days   [] Goals require adjustment due to lack of progress  [] Patient is not progressing as expected and requires additional follow up with physician  [] Other    Prognosis for POC: [x] Good [] Fair  [] Poor    Patient requires continued skilled intervention: [x] Yes  [] No        PLAN: 2x/week until normal ambulation, full ROM, and quad recruitment; decrease to 1x/week due to insurance visit limitations; progress to [facility] for functional progressions  [x] Continue per plan of care [] Alter current plan (see comments)  [] Plan of care initiated [] Hold pending MD visit [] Discharge    Electronically signed by: Hans Wright PT    Note: If patient does not return for scheduled/recommended follow up visits, this note will serve as a discharge from care along with the most recent update on progress.

## 2023-02-16 ENCOUNTER — HOSPITAL ENCOUNTER (OUTPATIENT)
Dept: PHYSICAL THERAPY | Age: 43
Setting detail: THERAPIES SERIES
Discharge: HOME | End: 2023-02-16
Payer: COMMERCIAL

## 2023-02-16 PROCEDURE — 97110 THERAPEUTIC EXERCISES: CPT

## 2023-02-16 PROCEDURE — 97112 NEUROMUSCULAR REEDUCATION: CPT

## 2023-02-16 PROCEDURE — 97140 MANUAL THERAPY 1/> REGIONS: CPT

## 2023-02-16 NOTE — FLOWSHEET NOTE
[place] [address] [address] [address]  Phone: [phone] Fax: [phone]    Physical Therapy Treatment Note/ Progress Report:       Date:  2023    Patient Name:  Carlos Eli    :  1980  MRN: [id number]  Restrictions/Precautions:    Medical/Treatment Diagnosis Information:  Diagnosis: s/p R knee sx 2/3/2023  Treatment Diagnosis: M25.561, M25.661, J94.60  Insurance/Certification information:  PT Insurance Information: [facility]; 20 visits/year; no auth; no ded; OOP not met  Physician Information:  Pee Pham MD  Plan of care signed (Y/N):     Date of Patient follow up with Physician:      Progress Report: []  Yes  [x]  No     Date Range for reporting period:  Beginnin2023  Ending: 10 visits or 30 days    Progress report due (10 Rx/or 30 days whichever is less): 2879     Recertification due (POC duration/ or 90 days whichever is less): 3/8/2023     Visit # Insurance Allowable Auth Needed   4 20 []Yes    [x]No     Latex Allergy:  [x]NO      []YES  Preferred Language for Healthcare:   [x]English       []other:  Functional Scale: LEFS:    Date assessed:2023    Pain level:  0-6/10     SUBJECTIVE:  Pt reports she was tired from last visit and felt like it was harder to do exercises yesterday.      OBJECTIVE:   Observation:   Test measurements:      23  ROM: 0-100 deg      RESTRICTIONS/PRECAUTIONS:  s/p R ACLr w/ quad tendon autograft, DOS: 2/3/2023    Exercises/Interventions:     Therapeutic Ex (33179)   Min: 20 Sets/sec Reps Notes/CUES   Retro Stepper/BIKE 5'  Partial to full revolutions   Seated HS stretch 30'' 3    Strap calf stretch   HEP   Quad set 10'' 10 Into towel roll   Heel slide   HEP   Heel prop      Alter G      BFR      Sportcord March      3 way SLR 1 10 Mod A from PT   SAQ      Clam ABD      Hip Ext Elisabeth Sly      BOSU fwd/side lunge      BOSU squat      Leg Press Iso/Con/Ecc 0- 10'' 10 Iso at 40 deg   Cybex HS curl      TKE 5'' 15 3.5 pl CC   Glute side walks      RDL      Slide Lunge      Slide HS eccentrics      Step ups/ecc step down      Swissball wall rolls- in SLS- hip drive      Quad hip ext/wall-ball rolls                  Manual Intervention (51673)  Min: 12      Knee mobs/PROM      Tib/Fem Mobs      Patella Mobs 6'  gentle   Ankle mobs      STM 6'  Lateral scar massage, quad tendon         NMR re-education (35689)  Min: 10 min   CUES NEEDED   VMS burst 10/10 8'  Quad set with Pt in long-sitting   BFR      G. Med activation      Hip Ext full ROM/ G. Activation      Bosu Bal and Prop- G Med      Single leg stance/Balance/Prop      Bosu Retro G. Med act      Prone Hip froggers- sliders/elevated            Therapeutic Activity (71451)  Min:      Ladders      Plyos      Dynamic Balance                            Therapeutic Exercise and NMR EXR  [x] (88532) Provided verbal/tactile cueing for activities related to strengthening, flexibility, endurance, ROM for improvements in LE, proximal hip, and core control with self care, mobility, lifting, ambulation. [x] (85364) Provided verbal/tactile cueing for activities related to improving balance, coordination, kinesthetic sense, posture, motor skill, proprioception  to assist with LE, proximal hip, and core control in self care, mobility, lifting, ambulation and eccentric single leg control.      NMR and Therapeutic Activities:    [x] (65729 or 07788) Provided verbal/tactile cueing for activities related to improving balance, coordination, kinesthetic sense, posture, motor skill, proprioception and motor activation to allow for proper function of core, proximal hip and LE with self care and ADLs and functional mobility.   [] (03720) Gait Re-education- Provided training and instruction to the patient for proper LE, core and proximal hip recruitment and positioning and eccentric body weight control with ambulation re-education including up and down stairs     Home Exercise Program:    [x] (32014) Reviewed/Progressed HEP activities related to strengthening, flexibility, endurance, ROM of core, proximal hip and LE for functional self-care, mobility, lifting and ambulation/stair navigation   [] (80442)Reviewed/Progressed HEP activities related to improving balance, coordination, kinesthetic sense, posture, motor skill, proprioception of core, proximal hip and LE for self care, mobility, lifting, and ambulation/stair navigation      Manual Treatments:  PROM / STM / Oscillations-Mobs:  G-I, II, III, IV (PA's, Inf., Post.)  [x] (02404) Provided manual therapy to mobilize LE, proximal hip and/or LS spine soft tissue/joints for the purpose of modulating pain, promoting relaxation,  increasing ROM, reducing/eliminating soft tissue swelling/inflammation/restriction, improving soft tissue extensibility and allowing for proper ROM for normal function with self care, mobility, lifting and ambulation. Modalities:     [x] GAME READY (VASO)- for significant edema, swelling, pain control x 10 min @ conclusion with pillowcase barrier placed and LE elevated on 1 pillow. Charges:  Timed Code Treatment Minutes: 40   Total Treatment Minutes: 50      [] EVAL (LOW) 49164 (typically 20 minutes face-to-face)  [] EVAL (MOD) 56586 (typically 30 minutes face-to-face)  [] EVAL (HIGH) 32983 (typically 45 minutes face-to-face)  [] RE-EVAL     [x] GX(47986) x  1   [] DRY NEEDLE 1 OR 2 MUSCLES  [x] NMR (70371) x 1    [] DRY NEEDLE 3+ MUSCLES  [x] Manual (11838) x 1      [] TA (80288) x     [] [facility] (92371[facility]  [] ES(attended) (01334)     [] ES (un) (61930):   [x] VASO (58568)  [] Other:      GOALS:  Patient stated goal: \"get back to working out, skiing, working\"  [] Progressing: [] Met: [x] Not Met: [] Adjusted    Therapist goals for Patient:   Short Term Goals: To be achieved in: 2 weeks  1.  Independent in HEP and progression per patient tolerance, in order to prevent re-injury. [] Progressing: [] Met: [x] Not Met: [] Adjusted  2. Patient will have a decrease in pain to facilitate improvement in movement, function, and ADLs as indicated by Functional Deficits. [] Progressing: [] Met: [x] Not Met: [] Adjusted    Long Term Goals: To be achieved in: 12 weeks  1. Disability index score of 70 or higher for the LEFS to assist with reaching prior level of function. [] Progressing: [] Met: [x] Not Met: [] Adjusted  2. Patient will demonstrate increased AROM to full R knee flexion and extension to allow for proper joint functioning as indicated by patients Functional Deficits. [] Progressing: [] Met: [x] Not Met: [] Adjusted  3. Patient will demonstrate an increase in Strength to good proximal hip strength and control, within 5lb HHD in LE to allow for proper functional mobility as indicated by patients Functional Deficits. [] Progressing: [] Met: [x] Not Met: [] Adjusted  4. Patient will return to walking without increased symptoms or restrictions and without functional activities without increased symptoms or restriction. [] Progressing: [] Met: [x] Not Met: [] Adjusted  5. Pt will be able to return to work without increased symptoms or duty restrictions. [] Progressing: [] Met: [x] Not Met: [] Adjusted     ASSESSMENT:  Good session. Pt had relief of some of her knee soreness following patellar mobilizations. Quad improving slowly but still requires estim to help with recruitment due to continued inhibition. Continue to progress as tolerated.     Return to Play: (if applicable)   [x]  Stage 1: Intro to Strength   []  Stage 2: Return to Run and Strength   []  Stage 3: Return to Jump and Strength   []  Stage 4: Dynamic Strength and Agility   []  Stage 5: Sport Specific Training     []  Ready to Return to Play, Meets All Above Stages   []  Not Ready for Return to Sports   Comments:            Treatment/Activity Tolerance:  [x] Patient tolerated treatment well [] Patient limited by ivania  [] Patient limited by pain  [] Patient limited by other medical complications  [] Other:     Overall Progression Towards Functional goals/ Treatment Progress Update:  [] Patient is progressing as expected towards functional goals listed. [] Progression is slowed due to complexities/Impairments listed. [] Progression has been slowed due to co-morbidities. [x] Plan just implemented, too soon to assess goals progression <30days   [] Goals require adjustment due to lack of progress  [] Patient is not progressing as expected and requires additional follow up with physician  [] Other    Prognosis for POC: [x] Good [] Fair  [] Poor    Patient requires continued skilled intervention: [x] Yes  [] No        PLAN: 2x/week until normal ambulation, full ROM, and quad recruitment; decrease to 1x/week due to insurance visit limitations; progress to [facility] for functional progressions  [x] Continue per plan of care [] Alter current plan (see comments)  [] Plan of care initiated [] Hold pending MD visit [] Discharge    Electronically signed by: Irina Orozco PT    Note: If patient does not return for scheduled/recommended follow up visits, this note will serve as a discharge from care along with the most recent update on progress.

## 2023-02-20 ENCOUNTER — HOSPITAL ENCOUNTER (OUTPATIENT)
Dept: PHYSICAL THERAPY | Age: 43
Setting detail: THERAPIES SERIES
Discharge: HOME | End: 2023-02-20
Payer: COMMERCIAL

## 2023-02-20 PROCEDURE — 97140 MANUAL THERAPY 1/> REGIONS: CPT

## 2023-02-20 PROCEDURE — 97112 NEUROMUSCULAR REEDUCATION: CPT

## 2023-02-20 PROCEDURE — 97110 THERAPEUTIC EXERCISES: CPT

## 2023-02-20 NOTE — FLOWSHEET NOTE
[place] [address] [facility] [address]  Phone: [phone] Fax: [phone]    Physical Therapy Treatment Note/ Progress Report:       Date:  2023    Patient Name:  Jose Rafael Campbell    :  1980  MRN: [id number]  Restrictions/Precautions:    Medical/Treatment Diagnosis Information:  Diagnosis: s/p R knee sx 2/3/2023  Treatment Diagnosis: M25.561, M25.661, T95.53  Insurance/Certification information:  PT Insurance Information: [facility]; 20 visits/year; no auth; no ded; OOP not met  Physician Information:  Irene Beltran MD  Plan of care signed (Y/N):     Date of Patient follow up with Physician:      Progress Report: []  Yes  [x]  No     Date Range for reporting period:  Beginnin2023  Ending: 10 visits or 30 days    Progress report due (10 Rx/or 30 days whichever is less): 2/3/7725     Recertification due (POC duration/ or 90 days whichever is less): 3/8/2023     Visit # Insurance Allowable Auth Needed   5 20 []Yes    [x]No     Latex Allergy:  [x]NO      []YES  Preferred Language for Healthcare:   [x]English       []other:  Functional Scale: LEFS:    Date assessed:2023    Pain level:  0/10     SUBJECTIVE:  Pt notes that she has been trialing a new TROM brace she got from a friend over the weekend, but notes that it has been squeezing her knee cap.  Her normal brace she feels is too big and slides down    OBJECTIVE:   Observation: pt presents ambulating with bilateral axillary crutches, and TROM brace locked in extension  Test measurements:      23  ROM: 0-100 deg      RESTRICTIONS/PRECAUTIONS:  s/p R ACLr w/ quad tendon autograft, DOS: 2/3/2023    Exercises/Interventions:     Therapeutic Ex (13491)   Min: 17 Sets/sec Reps Notes/CUES   Retro Stepper/BIKE 5'  Full revolutions   Seated HS stretch 30'' 3    Strap calf stretch   HEP   Quad set 10'' 10 Into towel roll   Heel slide   HEP   Heel prop      Alter G BFR      Standing reclined SLR 1 10 Max A from PT   3 way SLR 1 10 Mod A from PT   SAQ      Clam ABD      Hip Ext Vena Raspberry      BOSU fwd/side lunge      BOSU squat      Leg Press Iso/Con/Ecc 0- 10'' 10 Iso at 40 deg   Cybex HS curl      TKE 5'' 15 3 pl CC   Glute side walks      RDL      Slide Lunge      Slide HS eccentrics      Step ups/ecc step down      Swissball wall rolls- in SLS- hip drive      Quad hip ext/wall-ball rolls                  Manual Intervention (40333)  Min: 9      Knee PROM 3'  Flexion/extension   Tib/Fem Mobs      Patella Mobs 2'  gentle   Ankle mobs      STM 4'  Lateral scar massage, quad tendon         NMR re-education (73221)  Min: 12 min   CUES NEEDED   VMS burst 10/10 8'  Quad set with Pt in long-sitting   VMS burst 10/10 TKE 4'     G. Med activation      Hip Ext full ROM/ G. Activation      Bosu Bal and Prop- G Med      Single leg stance/Balance/Prop      Bosu Retro G. Med act      Prone Hip froggers- sliders/elevated            Therapeutic Activity (41368)  Min: 6      Ladders      Plyos      Dynamic Balance      Brace fitting, adjustments, education 6 min                     Therapeutic Exercise and NMR EXR  [x] (25857) Provided verbal/tactile cueing for activities related to strengthening, flexibility, endurance, ROM for improvements in LE, proximal hip, and core control with self care, mobility, lifting, ambulation. [x] (20422) Provided verbal/tactile cueing for activities related to improving balance, coordination, kinesthetic sense, posture, motor skill, proprioception  to assist with LE, proximal hip, and core control in self care, mobility, lifting, ambulation and eccentric single leg control.      NMR and Therapeutic Activities:    [x] (99574 or 00436) Provided verbal/tactile cueing for activities related to improving balance, coordination, kinesthetic sense, posture, motor skill, proprioception and motor activation to allow for proper function of core, proximal hip and LE with self care and ADLs and functional mobility.   [] (22203) Gait Re-education- Provided training and instruction to the patient for proper LE, core and proximal hip recruitment and positioning and eccentric body weight control with ambulation re-education including up and down stairs     Home Exercise Program:    [x] (74746) Reviewed/Progressed HEP activities related to strengthening, flexibility, endurance, ROM of core, proximal hip and LE for functional self-care, mobility, lifting and ambulation/stair navigation   [] (23126)Reviewed/Progressed HEP activities related to improving balance, coordination, kinesthetic sense, posture, motor skill, proprioception of core, proximal hip and LE for self care, mobility, lifting, and ambulation/stair navigation      Manual Treatments:  PROM / STM / Oscillations-Mobs:  G-I, II, III, IV (PA's, Inf., Post.)  [x] (20078) Provided manual therapy to mobilize LE, proximal hip and/or LS spine soft tissue/joints for the purpose of modulating pain, promoting relaxation,  increasing ROM, reducing/eliminating soft tissue swelling/inflammation/restriction, improving soft tissue extensibility and allowing for proper ROM for normal function with self care, mobility, lifting and ambulation. Modalities:     [] GAME READY (VASO)- for significant edema, swelling, pain control x 10 min @ conclusion with pillowcase barrier placed and LE elevated on 1 pillow.       Charges:  Timed Code Treatment Minutes: 44   Total Treatment Minutes: 44      [] EVAL (LOW) 48946 (typically 20 minutes face-to-face)  [] EVAL (MOD) 34463 (typically 30 minutes face-to-face)  [] EVAL (HIGH) 11227 (typically 45 minutes face-to-face)  [] RE-EVAL     [x] AH(26577) x  1   [] DRY NEEDLE 1 OR 2 MUSCLES  [x] NMR (08288) x 1    [] DRY NEEDLE 3+ MUSCLES  [x] Manual (64588) x 1      [] TA (95216) x     [] Mech Traction (08057)  [] ES(attended) (39305)     [] ES (un) (23942):   [] VASO (92752)  [] Other:      GOALS:  Patient stated goal: \"get back to working out, skiing, working\"  [] Progressing: [] Met: [x] Not Met: [] Adjusted    Therapist goals for Patient:   Short Term Goals: To be achieved in: 2 weeks  1. Independent in HEP and progression per patient tolerance, in order to prevent re-injury. [] Progressing: [] Met: [x] Not Met: [] Adjusted  2. Patient will have a decrease in pain to facilitate improvement in movement, function, and ADLs as indicated by Functional Deficits. [] Progressing: [] Met: [x] Not Met: [] Adjusted    Long Term Goals: To be achieved in: 12 weeks  1. Disability index score of 70 or higher for the LEFS to assist with reaching prior level of function. [] Progressing: [] Met: [x] Not Met: [] Adjusted  2. Patient will demonstrate increased AROM to full R knee flexion and extension to allow for proper joint functioning as indicated by patients Functional Deficits. [] Progressing: [] Met: [x] Not Met: [] Adjusted  3. Patient will demonstrate an increase in Strength to good proximal hip strength and control, within 5lb HHD in LE to allow for proper functional mobility as indicated by patients Functional Deficits. [] Progressing: [] Met: [x] Not Met: [] Adjusted  4. Patient will return to walking without increased symptoms or restrictions and without functional activities without increased symptoms or restriction. [] Progressing: [] Met: [x] Not Met: [] Adjusted  5. Pt will be able to return to work without increased symptoms or duty restrictions. [] Progressing: [] Met: [x] Not Met: [] Adjusted     ASSESSMENT:  Pt tolerated treatment session well today. Quadriceps neuromuscular control continued to be lacking and electrical stimulation utilized to assist with activation. Noted hip compensations for terminal knee extension. Education provided during session for brace use parameters for unlocking and locking. Brace positioned and fitted to not squeeze patellar and improve comfort for pt.  Pt will continue to benefit from skilled physical therapy to improve neuromuscular control, pain, ROM, strength, and muscular endurance to progress through her post-operative rehabilitation and return to PLOF. Return to Play: (if applicable)   [x]  Stage 1: Intro to Strength   []  Stage 2: Return to Run and Strength   []  Stage 3: Return to Jump and Strength   []  Stage 4: Dynamic Strength and Agility   []  Stage 5: Sport Specific Training     []  Ready to Return to Play, Meets All Above Stages   []  Not Ready for Return to Sports   Comments:            Treatment/Activity Tolerance:  [x] Patient tolerated treatment well [] Patient limited by fatique  [] Patient limited by pain  [] Patient limited by other medical complications  [] Other:     Overall Progression Towards Functional goals/ Treatment Progress Update:  [] Patient is progressing as expected towards functional goals listed. [] Progression is slowed due to complexities/Impairments listed. [] Progression has been slowed due to co-morbidities. [x] Plan just implemented, too soon to assess goals progression <30days   [] Goals require adjustment due to lack of progress  [] Patient is not progressing as expected and requires additional follow up with physician  [] Other    Prognosis for POC: [x] Good [] Fair  [] Poor    Patient requires continued skilled intervention: [x] Yes  [] No        PLAN: 2x/week until normal ambulation, full ROM, and quad recruitment; decrease to 1x/week due to insurance visit limitations; progress to [facility] for functional progressions  [x] Continue per plan of care [] Alter current plan (see comments)  [] Plan of care initiated [] Hold pending MD visit [] Discharge    Electronically signed by: Leo Gill PT    Note: If patient does not return for scheduled/recommended follow up visits, this note will serve as a discharge from care along with the most recent update on progress.

## 2023-02-22 ENCOUNTER — HOSPITAL ENCOUNTER (OUTPATIENT)
Dept: PHYSICAL THERAPY | Age: 43
Setting detail: THERAPIES SERIES
Discharge: HOME | End: 2023-02-22
Payer: COMMERCIAL

## 2023-02-22 PROCEDURE — 97140 MANUAL THERAPY 1/> REGIONS: CPT

## 2023-02-22 PROCEDURE — 97112 NEUROMUSCULAR REEDUCATION: CPT

## 2023-02-22 PROCEDURE — 97110 THERAPEUTIC EXERCISES: CPT

## 2023-02-22 NOTE — FLOWSHEET NOTE
[place] [address] [facility] [address]  Phone: [phone] Fax: [phone]    Physical Therapy Treatment Note/ Progress Report:       Date:  2023    Patient Name:  Jose Rafael Campbell    :  1980  MRN: [id number]  Restrictions/Precautions:    Medical/Treatment Diagnosis Information:  Diagnosis: s/p R knee sx 2/3/2023  Treatment Diagnosis: M25.561, M25.661, B89.00  Insurance/Certification information:  PT Insurance Information: [facility]; 20 visits/year; no auth; no ded; OOP not met  Physician Information:  Irene Beltran MD  Plan of care signed (Y/N):     Date of Patient follow up with Physician:      Progress Report: []  Yes  [x]  No     Date Range for reporting period:  Beginnin2023  Ending: 10 visits or 30 days    Progress report due (10 Rx/or 30 days whichever is less): 1/3/8288     Recertification due (POC duration/ or 90 days whichever is less): 3/8/2023     Visit # Insurance Allowable Auth Needed   6 20 []Yes    [x]No     Latex Allergy:  [x]NO      []YES  Preferred Language for Healthcare:   [x]English       []other:  Functional Scale: LEFS:    Date assessed:2023    Pain level:  0/10     SUBJECTIVE:  Patient reports that her knee is doing okay. Not having any pain but swelling persists. Feels that she wasn't very good at activating quad last visit.      OBJECTIVE:   Observation: pt presents ambulating with bilateral axillary crutches, and TROM brace locked in extension  Test measurements:      23  ROM: 0-100 deg      RESTRICTIONS/PRECAUTIONS:  s/p R ACLr w/ quad tendon autograft, DOS: 2/3/2023    Exercises/Interventions:     Therapeutic Ex (82601)   Min: 17 Sets/sec Reps Notes/CUES   Retro Stepper/BIKE 5'  Full revolutions   Seated HS stretch 30'' 3    Strap calf stretch   HEP   Quad set 10'' 10 Into towel roll   Heel slide   HEP   Heel prop      Alter G      BFR      Standing reclined SLR 1 10 Max A from PT   3 way SLR 1 10 Mod A from PT   SAQ      Clam ABD      Hip Ext Flora Spanner      BOSU fwd/side lunge      BOSU squat      Leg Press Iso/Con/Ecc 0- 10'' 10 Iso at 40 deg   Cybex HS curl      TKE 5'' 15 3 pl CC   Glute side walks      RDL      Slide Lunge      Slide HS eccentrics      Step ups/ecc step down      Swissball wall rolls- in SLS- hip drive      Quad hip ext/wall-ball rolls                  Manual Intervention (15521)  Min: 9      Knee PROM 3'  Flexion/extension   Tib/Fem Mobs      Patella Mobs 2'  gentle   Ankle mobs      STM 4'  Lateral scar massage, quad tendon         NMR re-education (31685)  Min: 12 min   CUES NEEDED   VMS burst 10/10 8'  Quad set with Pt in long-sitting   VMS burst 10/10 TKE 4'     G. Med activation      Hip Ext full ROM/ G. Activation      Bosu Bal and Prop- G Med      Single leg stance/Balance/Prop      Bosu Retro G. Med act      Prone Hip froggers- sliders/elevated            Therapeutic Activity (83803)  Min: 6      Ladders      Plyos      Dynamic Balance      Brace fitting, adjustments, education 6 min                     Therapeutic Exercise and NMR EXR  [x] (55967) Provided verbal/tactile cueing for activities related to strengthening, flexibility, endurance, ROM for improvements in LE, proximal hip, and core control with self care, mobility, lifting, ambulation. [x] (71935) Provided verbal/tactile cueing for activities related to improving balance, coordination, kinesthetic sense, posture, motor skill, proprioception  to assist with LE, proximal hip, and core control in self care, mobility, lifting, ambulation and eccentric single leg control. NMR and Therapeutic Activities:    [x] (51240 or 12455) Provided verbal/tactile cueing for activities related to improving balance, coordination, kinesthetic sense, posture, motor skill, proprioception and motor activation to allow for proper function of core, proximal hip and LE with self care and ADLs and functional mobility. [] (93581) Gait Re-education- Provided training and instruction to the patient for proper LE, core and proximal hip recruitment and positioning and eccentric body weight control with ambulation re-education including up and down stairs     Home Exercise Program:    [x] (35971) Reviewed/Progressed HEP activities related to strengthening, flexibility, endurance, ROM of core, proximal hip and LE for functional self-care, mobility, lifting and ambulation/stair navigation   [] (74532)Reviewed/Progressed HEP activities related to improving balance, coordination, kinesthetic sense, posture, motor skill, proprioception of core, proximal hip and LE for self care, mobility, lifting, and ambulation/stair navigation      Manual Treatments:  PROM / STM / Oscillations-Mobs:  G-I, II, III, IV (PA's, Inf., Post.)  [x] (19204) Provided manual therapy to mobilize LE, proximal hip and/or LS spine soft tissue/joints for the purpose of modulating pain, promoting relaxation,  increasing ROM, reducing/eliminating soft tissue swelling/inflammation/restriction, improving soft tissue extensibility and allowing for proper ROM for normal function with self care, mobility, lifting and ambulation. Modalities:     [] GAME READY (VASO)- for significant edema, swelling, pain control x 10 min @ conclusion with pillowcase barrier placed and LE elevated on 1 pillow.       Charges:  Timed Code Treatment Minutes: 44   Total Treatment Minutes: 44      [] EVAL (LOW) 86646 (typically 20 minutes face-to-face)  [] EVAL (MOD) 79914 (typically 30 minutes face-to-face)  [] EVAL (HIGH) 53012 (typically 45 minutes face-to-face)  [] RE-EVAL     [x] UT(68176) x  1   [] DRY NEEDLE 1 OR 2 MUSCLES  [x] NMR (45066) x 1    [] DRY NEEDLE 3+ MUSCLES  [x] Manual (48247) x 1      [] TA (18971) x     [] Mech Traction (60382)  [] ES(attended) (71637)     [] ES (un) (79538):   [] VASO (00068)  [] Other:      GOALS:  Patient stated goal: \"get back to working out, skiing, working\"  [] Progressing: [] Met: [x] Not Met: [] Adjusted    Therapist goals for Patient:   Short Term Goals: To be achieved in: 2 weeks  1. Independent in HEP and progression per patient tolerance, in order to prevent re-injury. [] Progressing: [] Met: [x] Not Met: [] Adjusted  2. Patient will have a decrease in pain to facilitate improvement in movement, function, and ADLs as indicated by Functional Deficits. [] Progressing: [] Met: [x] Not Met: [] Adjusted    Long Term Goals: To be achieved in: 12 weeks  1. Disability index score of 70 or higher for the LEFS to assist with reaching prior level of function. [] Progressing: [] Met: [x] Not Met: [] Adjusted  2. Patient will demonstrate increased AROM to full R knee flexion and extension to allow for proper joint functioning as indicated by patients Functional Deficits. [] Progressing: [] Met: [x] Not Met: [] Adjusted  3. Patient will demonstrate an increase in Strength to good proximal hip strength and control, within 5lb HHD in LE to allow for proper functional mobility as indicated by patients Functional Deficits. [] Progressing: [] Met: [x] Not Met: [] Adjusted  4. Patient will return to walking without increased symptoms or restrictions and without functional activities without increased symptoms or restriction. [] Progressing: [] Met: [x] Not Met: [] Adjusted  5. Pt will be able to return to work without increased symptoms or duty restrictions. [] Progressing: [] Met: [x] Not Met: [] Adjusted     ASSESSMENT:  Good tolerance to treatment. Improved with quad set today. ROM progressing well. Still Having difficulty with qs in TKE during strance phase. Cues to bring knee through swing phase rather than toeing out.      Return to Play: (if applicable)   [x]  Stage 1: Intro to Strength   []  Stage 2: Return to Run and Strength   []  Stage 3: Return to Jump and Strength   []  Stage 4: Dynamic Strength and Agility   []  Stage 5: Sport Specific Training     []  Ready to Return to Play, Meets All Above Stages   []  Not Ready for Return to Sports   Comments:            Treatment/Activity Tolerance:  [x] Patient tolerated treatment well [] Patient limited by fatique  [] Patient limited by pain  [] Patient limited by other medical complications  [] Other:     Overall Progression Towards Functional goals/ Treatment Progress Update:  [] Patient is progressing as expected towards functional goals listed. [] Progression is slowed due to complexities/Impairments listed. [] Progression has been slowed due to co-morbidities. [x] Plan just implemented, too soon to assess goals progression <30days   [] Goals require adjustment due to lack of progress  [] Patient is not progressing as expected and requires additional follow up with physician  [] Other    Prognosis for POC: [x] Good [] Fair  [] Poor    Patient requires continued skilled intervention: [x] Yes  [] No        PLAN: 2x/week until normal ambulation, full ROM, and quad recruitment; decrease to 1x/week due to insurance visit limitations; progress to [facility] for functional progressions  [x] Continue per plan of care [] Alter current plan (see comments)  [] Plan of care initiated [] Hold pending MD visit [] Discharge    Electronically signed by: Lio Smiley PTA    Note: If patient does not return for scheduled/recommended follow up visits, this note will serve as a discharge from care along with the most recent update on progress.

## 2023-02-27 ENCOUNTER — HOSPITAL ENCOUNTER (OUTPATIENT)
Dept: PHYSICAL THERAPY | Age: 43
Setting detail: THERAPIES SERIES
Discharge: HOME | End: 2023-02-27
Payer: COMMERCIAL

## 2023-02-27 PROCEDURE — 97140 MANUAL THERAPY 1/> REGIONS: CPT

## 2023-02-27 PROCEDURE — 97110 THERAPEUTIC EXERCISES: CPT

## 2023-02-27 PROCEDURE — 97112 NEUROMUSCULAR REEDUCATION: CPT

## 2023-02-27 NOTE — FLOWSHEET NOTE
[place] [address] [facility]  Phone: [phone] Fax: [phone]    Physical Therapy Treatment Note/ Progress Report:       Date:  2023    Patient Name:  Melina Reddy    :  1980  MRN: [id number]  Restrictions/Precautions:    Medical/Treatment Diagnosis Information:  Diagnosis: s/p R knee sx 2/3/2023  Treatment Diagnosis: M25.561, M25.661, Z27.25  Insurance/Certification information:  PT Insurance Information: [facility]; 20 visits/year; no auth; no ded; OOP not met  Physician Information:  Ekta Castle MD  Plan of care signed (Y/N):     Date of Patient follow up with Physician:      Progress Report: []  Yes  [x]  No     Date Range for reporting period:  Beginnin2023  Ending: 10 visits or 30 days    Progress report due (10 Rx/or 30 days whichever is less): 4795     Recertification due (POC duration/ or 90 days whichever is less): 3/8/2023     Visit # Insurance Allowable Auth Needed   7 20 []Yes    [x]No     Latex Allergy:  [x]NO      []YES  Preferred Language for Healthcare:   [x]English       []other:  Functional Scale: LEFS:    Date assessed:2023    Pain level:  0/10     SUBJECTIVE:  Patient reports that her knee is doing well. Feels more comfortable on it.     OBJECTIVE:   Observation: pt presents ambulating with bilateral axillary crutches, TROM brace unlocked  Test measurements:      23  ROM: 0-100 deg      RESTRICTIONS/PRECAUTIONS:  s/p R ACLr w/ quad tendon autograft, DOS: 2/3/2023    Exercises/Interventions:     Therapeutic Ex (71432)   Min: 25 Sets/sec Reps Notes/CUES   Retro Stepper/BIKE 5'  Full revolutions   Seated HS stretch 30'' 3    Strap calf stretch   HEP   Quad set 10'' 10 Into towel roll   Heel slide   HEP   Heel prop      Alter G      BFR      Standing reclined SLR 1 10 Max A from PT   3 way SLR 1 10 Mod A from PT   SAQ      Clam ABD      Hip Ext Macho Oris      BOSU fwd/side lunge      BOSU squat      Leg Press Iso 10'' 10 Iso at 30 deg   Leg press 2 10 70#; 70-10 deg   TKE 5'' 15 4 pl CC   Glute side walks      RDL      Slide Lunge      Slide HS eccentrics      Step ups/ecc step down      Swissball wall rolls- in SLS- hip drive      Quad hip ext/wall-ball rolls                  Manual Intervention (33625)  Min: 9      Knee PROM 3'  Flexion/extension   Tib/Fem Mobs      Patella Mobs 2'  gentle   Ankle mobs      STM 4'  Lateral scar massage, quad tendon         NMR re-education (56846)  Min: 12 min   CUES NEEDED   VMS burst 10/10 4'  Quad set into SLR   VMS burst 10/10 biofeedback quad set 8'  supine   G. Med activation      Hip Ext full ROM/ G. Activation      Bosu Bal and Prop- G Med      Single leg stance/Balance/Prop      Bosu Retro G. Med act      Prone Hip froggers- sliders/elevated            Therapeutic Activity (59674)  Min: 6      Ladders      Plyos      Dynamic Balance      Brace fitting, adjustments, education 6 min                     Therapeutic Exercise and NMR EXR  [x] (51327) Provided verbal/tactile cueing for activities related to strengthening, flexibility, endurance, ROM for improvements in LE, proximal hip, and core control with self care, mobility, lifting, ambulation. [x] (53173) Provided verbal/tactile cueing for activities related to improving balance, coordination, kinesthetic sense, posture, motor skill, proprioception  to assist with LE, proximal hip, and core control in self care, mobility, lifting, ambulation and eccentric single leg control.      NMR and Therapeutic Activities:    [x] (35410 or 78286) Provided verbal/tactile cueing for activities related to improving balance, coordination, kinesthetic sense, posture, motor skill, proprioception and motor activation to allow for proper function of core, proximal hip and LE with self care and ADLs and functional mobility.   [] (30525) Gait Re-education- Provided training and instruction to the patient for proper LE, core and proximal hip recruitment and positioning and eccentric body weight control with ambulation re-education including up and down stairs     Home Exercise Program:    [x] (36135) Reviewed/Progressed HEP activities related to strengthening, flexibility, endurance, ROM of core, proximal hip and LE for functional self-care, mobility, lifting and ambulation/stair navigation   [] (94566)Reviewed/Progressed HEP activities related to improving balance, coordination, kinesthetic sense, posture, motor skill, proprioception of core, proximal hip and LE for self care, mobility, lifting, and ambulation/stair navigation      Manual Treatments:  PROM / STM / Oscillations-Mobs:  G-I, II, III, IV (PA's, Inf., Post.)  [x] (83301) Provided manual therapy to mobilize LE, proximal hip and/or LS spine soft tissue/joints for the purpose of modulating pain, promoting relaxation,  increasing ROM, reducing/eliminating soft tissue swelling/inflammation/restriction, improving soft tissue extensibility and allowing for proper ROM for normal function with self care, mobility, lifting and ambulation. Modalities:     [] GAME READY (VASO)- for significant edema, swelling, pain control x 10 min @ conclusion with pillowcase barrier placed and LE elevated on 1 pillow.       Charges:  Timed Code Treatment Minutes: 46   Total Treatment Minutes: 46      [] EVAL (LOW) 61523 (typically 20 minutes face-to-face)  [] EVAL (MOD) 32881 (typically 30 minutes face-to-face)  [] EVAL (HIGH) 09581 (typically 45 minutes face-to-face)  [] RE-EVAL     [x] WV(40447) x  1   [] DRY NEEDLE 1 OR 2 MUSCLES  [x] NMR (16202) x 1    [] DRY NEEDLE 3+ MUSCLES  [x] Manual (46511) x 1      [] TA (11758) x     [] Mech Traction (11424)  [] ES(attended) (25181)     [] ES (un) (57190):   [] VASO (84174)  [] Other:      GOALS:  Patient stated goal: \"get back to working out, skiing, working\"  [] Progressing: [] Met: [x] Not Met: [] Adjusted    Therapist goals for Patient:   Short Term Goals: To be achieved in: 2 weeks  1. Independent in HEP and progression per patient tolerance, in order to prevent re-injury. [] Progressing: [] Met: [x] Not Met: [] Adjusted  2. Patient will have a decrease in pain to facilitate improvement in movement, function, and ADLs as indicated by Functional Deficits. [] Progressing: [] Met: [x] Not Met: [] Adjusted    Long Term Goals: To be achieved in: 12 weeks  1. Disability index score of 70 or higher for the LEFS to assist with reaching prior level of function. [] Progressing: [] Met: [x] Not Met: [] Adjusted  2. Patient will demonstrate increased AROM to full R knee flexion and extension to allow for proper joint functioning as indicated by patients Functional Deficits. [] Progressing: [] Met: [x] Not Met: [] Adjusted  3. Patient will demonstrate an increase in Strength to good proximal hip strength and control, within 5lb HHD in LE to allow for proper functional mobility as indicated by patients Functional Deficits. [] Progressing: [] Met: [x] Not Met: [] Adjusted  4. Patient will return to walking without increased symptoms or restrictions and without functional activities without increased symptoms or restriction. [] Progressing: [] Met: [x] Not Met: [] Adjusted  5. Pt will be able to return to work without increased symptoms or duty restrictions. [] Progressing: [] Met: [x] Not Met: [] Adjusted     ASSESSMENT:  Good tolerance to treatment. Improved with quad set today. ROM progressing well. TKE improved today, unlocked brace for ambulation but pt to continue using crutches, discharge them as able pending quad strength during ambulation. Cues to bring knee through swing phase rather than toeing out.      Return to Play: (if applicable)   [x]  Stage 1: Intro to Strength   []  Stage 2: Return to Run and Strength   []  Stage 3: Return to Jump and Strength   []  Stage 4: Dynamic Strength and Agility   []  Stage 5: Sport Specific Training     []  Ready to Return to Play, Meets All Above Stages   []  Not Ready for Return to Sports   Comments:            Treatment/Activity Tolerance:  [x] Patient tolerated treatment well [] Patient limited by fatique  [] Patient limited by pain  [] Patient limited by other medical complications  [] Other:     Overall Progression Towards Functional goals/ Treatment Progress Update:  [x] Patient is progressing as expected towards functional goals listed. [] Progression is slowed due to complexities/Impairments listed. [] Progression has been slowed due to co-morbidities. [] Plan just implemented, too soon to assess goals progression <30days   [] Goals require adjustment due to lack of progress  [] Patient is not progressing as expected and requires additional follow up with physician  [] Other    Prognosis for POC: [x] Good [] Fair  [] Poor    Patient requires continued skilled intervention: [x] Yes  [] No        PLAN: 2x/week until normal ambulation, full ROM, and quad recruitment; decrease to 1x/week due to insurance visit limitations; progress to [facility] for functional progressions  [x] Continue per plan of care [] Alter current plan (see comments)  [] Plan of care initiated [] Hold pending MD visit [] Discharge    Electronically signed by: Kamille Her PT    Note: If patient does not return for scheduled/recommended follow up visits, this note will serve as a discharge from care along with the most recent update on progress.

## 2023-03-02 ENCOUNTER — HOSPITAL ENCOUNTER (OUTPATIENT)
Dept: PHYSICAL THERAPY | Age: 43
Setting detail: THERAPIES SERIES
Discharge: HOME | End: 2023-03-02
Payer: COMMERCIAL

## 2023-03-02 PROCEDURE — 97112 NEUROMUSCULAR REEDUCATION: CPT

## 2023-03-02 PROCEDURE — 97140 MANUAL THERAPY 1/> REGIONS: CPT

## 2023-03-02 PROCEDURE — 97110 THERAPEUTIC EXERCISES: CPT

## 2023-03-02 NOTE — FLOWSHEET NOTE
[place] [address] [facility] Center[facility]  Phone: [phone] Fax: [phone]    Physical Therapy Treatment Note/ Progress Report:       Date:  2023    Patient Name:  Freddy Kilgore    :  1980  MRN: [id number]  Restrictions/Precautions:    Medical/Treatment Diagnosis Information:  Diagnosis: s/p R knee sx 2/3/2023  Treatment Diagnosis: M25.561, M25.661, T88.93  Insurance/Certification information:  PT Insurance Information: [facility]; 20 visits/year; no auth; no ded; OOP not met  Physician Information:  Ema Tsang MD  Plan of care signed (Y/N):     Date of Patient follow up with Physician:      Progress Report: []  Yes  [x]  No     Date Range for reporting period:  Beginnin2023  Ending: 10 visits or 30 days    Progress report due (10 Rx/or 30 days whichever is less): 7425     Recertification due (POC duration/ or 90 days whichever is less): 3/8/2023     Visit # Insurance Allowable Auth Needed   8 20 []Yes    [x]No     Latex Allergy:  [x]NO      []YES  Preferred Language for Healthcare:   [x]English       []other:  Functional Scale: LEFS:    Date assessed:2023    Pain level:  0/10     SUBJECTIVE:  Patient reports that her knee feels better since unlocking brace last visit.     OBJECTIVE:   Observation: TROM brace unlocked, no crutches around clinic w/ good quad recruitment  Test measurements:      23  ROM: 0-100 deg      RESTRICTIONS/PRECAUTIONS:  s/p R ACLr w/ quad tendon autograft, DOS: 2/3/2023    Exercises/Interventions:     Therapeutic Ex (70088)   Min: 25 Sets/sec Reps Notes/CUES   Retro Stepper/BIKE 5'  Full revolutions   Seated HS stretch 30'' 3    Strap calf stretch   HEP   Quad set 10'' 10 Into towel roll   Heel slide   HEP   Heel prop      Alter G      BFR      Standing reclined SLR   Max A from PT   3 way SLR 1 10 HEP   SAQ      Clam ABD      Hip Ext /table      BOSU fwd/side lunge 10'' 10    BOSU squat      Leg Press Iso 10'' 10 Iso at 30 deg   Leg press 3 10 70#; 70-10 deg   TKE 5'' 15 4 pl CC   Glute side walks      RDL      Slide Lunge      Slide HS eccentrics      Step ups/ecc step down      Swissball wall rolls- in SLS- hip drive      Quad hip ext/wall-ball rolls                  Manual Intervention (42005)  Min: 12      Knee PROM 3'  Flexion/extension   Tib/Fem Mobs      Patella Mobs 2'  gentle   Ankle mobs      STM 6'  Lateral scar massage, quad tendon         NMR re-education (05655)  Min: 12 min   CUES NEEDED   VMS burst 10/10 4'  Quad set into SLR   VMS burst 10/10 biofeedback quad set 8'  supine   G. Med activation      Hip Ext full ROM/ G. Activation      Bosu Bal and Prop- G Med      Single leg stance/Balance/Prop 20'' 3 At half wall   Bosu Retro G. Med act      Prone Hip froggers- sliders/elevated            Therapeutic Activity (51886)  Min: 6      Ladders      Plyos      Dynamic Balance      Brace fitting, adjustments, education 6 min                     Therapeutic Exercise and NMR EXR  [x] (18606) Provided verbal/tactile cueing for activities related to strengthening, flexibility, endurance, ROM for improvements in LE, proximal hip, and core control with self care, mobility, lifting, ambulation. [x] (31846) Provided verbal/tactile cueing for activities related to improving balance, coordination, kinesthetic sense, posture, motor skill, proprioception  to assist with LE, proximal hip, and core control in self care, mobility, lifting, ambulation and eccentric single leg control.      NMR and Therapeutic Activities:    [x] (53419 or 76249) Provided verbal/tactile cueing for activities related to improving balance, coordination, kinesthetic sense, posture, motor skill, proprioception and motor activation to allow for proper function of core, proximal hip and LE with self care and ADLs and functional mobility.   [] (29232) Gait Re-education- Provided training and instruction to the patient for proper LE, core and proximal hip recruitment and positioning and eccentric body weight control with ambulation re-education including up and down stairs     Home Exercise Program:    [x] (15338) Reviewed/Progressed HEP activities related to strengthening, flexibility, endurance, ROM of core, proximal hip and LE for functional self-care, mobility, lifting and ambulation/stair navigation   [] (17554)Reviewed/Progressed HEP activities related to improving balance, coordination, kinesthetic sense, posture, motor skill, proprioception of core, proximal hip and LE for self care, mobility, lifting, and ambulation/stair navigation      Manual Treatments:  PROM / STM / Oscillations-Mobs:  G-I, II, III, IV (PA's, Inf., Post.)  [x] (15151) Provided manual therapy to mobilize LE, proximal hip and/or LS spine soft tissue/joints for the purpose of modulating pain, promoting relaxation,  increasing ROM, reducing/eliminating soft tissue swelling/inflammation/restriction, improving soft tissue extensibility and allowing for proper ROM for normal function with self care, mobility, lifting and ambulation.     Modalities:     [x] GAME READY (VASO)- for significant edema, swelling, pain control x 10 min @ conclusion with pillowcase barrier placed and LE elevated on 1 pillow.      Charges:  Timed Code Treatment Minutes: 54   Total Treatment Minutes: 64      [] EVAL (LOW) 43723 (typically 20 minutes face-to-face)  [] EVAL (MOD) 84212 (typically 30 minutes face-to-face)  [] EVAL (HIGH) 78405 (typically 45 minutes face-to-face)  [] RE-EVAL     [x] TE(10728) x  2  [] DRY NEEDLE 1 OR 2 MUSCLES  [x] NMR (51319) x 1    [] DRY NEEDLE 3+ MUSCLES  [x] Manual (18741) x 1      [] TA (54115) x     [] Mech Traction (70620)  [] ES(attended) (95977)     [] ES (un) (30592):   [] VASO (81545)  [] Other:      GOALS:  Patient stated goal: \"get back to working out, skiing, working\"  [] Progressing: [] Met: [x] Not Met: [] Adjusted    Therapist  goals for Patient:   Short Term Goals: To be achieved in: 2 weeks  1. Independent in HEP and progression per patient tolerance, in order to prevent re-injury. [] Progressing: [] Met: [x] Not Met: [] Adjusted  2. Patient will have a decrease in pain to facilitate improvement in movement, function, and ADLs as indicated by Functional Deficits. [] Progressing: [] Met: [x] Not Met: [] Adjusted    Long Term Goals: To be achieved in: 12 weeks  1. Disability index score of 70 or higher for the LEFS to assist with reaching prior level of function. [] Progressing: [] Met: [x] Not Met: [] Adjusted  2. Patient will demonstrate increased AROM to full R knee flexion and extension to allow for proper joint functioning as indicated by patients Functional Deficits. [] Progressing: [] Met: [x] Not Met: [] Adjusted  3. Patient will demonstrate an increase in Strength to good proximal hip strength and control, within 5lb HHD in LE to allow for proper functional mobility as indicated by patients Functional Deficits. [] Progressing: [] Met: [x] Not Met: [] Adjusted  4. Patient will return to walking without increased symptoms or restrictions and without functional activities without increased symptoms or restriction. [] Progressing: [] Met: [x] Not Met: [] Adjusted  5. Pt will be able to return to work without increased symptoms or duty restrictions. [] Progressing: [] Met: [x] Not Met: [] Adjusted     ASSESSMENT:  Good tolerance to treatment. Pt ambulated around clinic in unlocked brace without crutches w/ good quad use, discharged for home. Tolerating increased load, minimal lag w/ SLR and instructed pt to perform at home. Pt to continue to benefit from skilled PT to improve functional strength and mobility.     Return to Play: (if applicable)   [x]  Stage 1: Intro to Strength   []  Stage 2: Return to Run and Strength   []  Stage 3: Return to Jump and Strength   []  Stage 4: Dynamic Strength and Agility   []  Stage 5: Sport Specific Training     []  Ready to Return to Play, Meets All Above Stages   []  Not Ready for Return to Sports   Comments:            Treatment/Activity Tolerance:  [x] Patient tolerated treatment well [] Patient limited by fatique  [] Patient limited by pain  [] Patient limited by other medical complications  [] Other:     Overall Progression Towards Functional goals/ Treatment Progress Update:  [x] Patient is progressing as expected towards functional goals listed. [] Progression is slowed due to complexities/Impairments listed. [] Progression has been slowed due to co-morbidities. [] Plan just implemented, too soon to assess goals progression <30days   [] Goals require adjustment due to lack of progress  [] Patient is not progressing as expected and requires additional follow up with physician  [] Other    Prognosis for POC: [x] Good [] Fair  [] Poor    Patient requires continued skilled intervention: [x] Yes  [] No        PLAN: 2x/week until normal ambulation, full ROM, and quad recruitment; decrease to 1x/week due to insurance visit limitations; progress to [facility] for functional progressions  [x] Continue per plan of care [] Alter current plan (see comments)  [] Plan of care initiated [] Hold pending MD visit [] Discharge    Electronically signed by: Marli Martniez PT    Note: If patient does not return for scheduled/recommended follow up visits, this note will serve as a discharge from care along with the most recent update on progress.

## 2023-03-06 ENCOUNTER — APPOINTMENT (OUTPATIENT)
Dept: PHYSICAL THERAPY | Age: 43
End: 2023-03-06
Payer: COMMERCIAL

## 2023-03-07 ENCOUNTER — HOSPITAL ENCOUNTER (OUTPATIENT)
Dept: PHYSICAL THERAPY | Age: 43
Setting detail: THERAPIES SERIES
Discharge: HOME | End: 2023-03-07
Payer: COMMERCIAL

## 2023-03-07 PROCEDURE — 97140 MANUAL THERAPY 1/> REGIONS: CPT

## 2023-03-07 PROCEDURE — 97110 THERAPEUTIC EXERCISES: CPT

## 2023-03-07 NOTE — FLOWSHEET NOTE
[place] [address] [address] [place]  Phone: [phone] Fax: [phone]    Physical Therapy Treatment Note/ Progress Report:       Date:  2023    Patient Name:  Marisol Boone    :  1980  MRN: [id number]  Restrictions/Precautions:    Medical/Treatment Diagnosis Information:  Diagnosis: s/p R knee sx 2/3/2023  Treatment Diagnosis: M25.561, M25.661, M34.30  Insurance/Certification information:  PT Insurance Information: [facility]; 20 visits/year; no auth; no ded; OOP not met  Physician Information:  Jarvis Figueroa MD  Plan of care signed (Y/N):     Date of Patient follow up with Physician:      Progress Report: []  Yes  [x]  No     Date Range for reporting period:  Beginnin2023  Ending: 10 visits or 30 days    Progress report due (10 Rx/or 30 days whichever is less): 521     Recertification due (POC duration/ or 90 days whichever is less): 3/8/2023     Visit # Insurance Allowable Auth Needed   9 20 []Yes    [x]No     Latex Allergy:  [x]NO      []YES  Preferred Language for Healthcare:   [x]English       []other:  Functional Scale: LEFS:    Date assessed:2023    Pain level:  0/10     SUBJECTIVE:  Patient reports that her knee is doing well, feels like unlocking the brace has helped a lot.      OBJECTIVE:   Observation: TROM brace unlocked, no crutches around clinic w/ good quad recruitment  Test measurements:      23  ROM: 0-100 deg      RESTRICTIONS/PRECAUTIONS:  s/p R ACLr w/ quad tendon autograft, DOS: 2/3/2023    Exercises/Interventions:     Therapeutic Ex (09876)   Min: 25 Sets/sec Reps Notes/CUES   Retro Stepper/BIKE 5'  Full revolutions   Seated HS stretch 30'' 3    Strap calf stretch   HEP   Quad set 10'' 10 Into towel roll   Heel slide   HEP   Heel prop      Alter G      BFR      Standing reclined SLR   Max A from PT   3 way SLR 1 10 HEP   SAQ      Clam ABD      Hip Ext Nghia RAMOSU fwd/side lunge 10'' 10    BOSU squat      Leg Press Iso 10'' 10 Iso at 30 deg   Leg press 3 10 70#; 70-10 deg   TKE - ball into wall 10'' 10    Glute side walks      RDL      Mini squat 1 15 At half wall   Slide Lunge 1 15 At half wall   Slide HS eccentrics      Step ups/ecc step down      Swissball wall rolls- in SLS- hip drive      Quad hip ext/wall-ball rolls                  Manual Intervention (13208)  Min: 18      Knee PROM 5'  Flexion/extension   Tib/Fem Mobs      Patella Mobs 5'  gentle   Ankle mobs      STM 8'  Lateral scar massage, quad tendon         NMR re-education (20892)  Min:  min   CUES NEEDED   VMS burst 10/10   Quad set into SLR   VMS burst 10/10 biofeedback quad set   supine   G. Med activation      Hip Ext full ROM/ G. Activation      Bosu Bal and Prop- G Med      Single leg stance/Balance/Prop 20'' 3 At half wall   Bosu Retro G. Med act      Prone Hip froggers- sliders/elevated            Therapeutic Activity (72187)  Min: 6      Ladders      Plyos      Dynamic Balance      Brace fitting, adjustments, education 6 min                     Therapeutic Exercise and NMR EXR  [x] (77814) Provided verbal/tactile cueing for activities related to strengthening, flexibility, endurance, ROM for improvements in LE, proximal hip, and core control with self care, mobility, lifting, ambulation. [x] (56522) Provided verbal/tactile cueing for activities related to improving balance, coordination, kinesthetic sense, posture, motor skill, proprioception  to assist with LE, proximal hip, and core control in self care, mobility, lifting, ambulation and eccentric single leg control. NMR and Therapeutic Activities:    [x] (87967 or 22907) Provided verbal/tactile cueing for activities related to improving balance, coordination, kinesthetic sense, posture, motor skill, proprioception and motor activation to allow for proper function of core, proximal hip and LE with self care and ADLs and functional mobility.    [] (67702) Gait Re-education- Provided training and instruction to the patient for proper LE, core and proximal hip recruitment and positioning and eccentric body weight control with ambulation re-education including up and down stairs     Home Exercise Program:    [x] (70302) Reviewed/Progressed HEP activities related to strengthening, flexibility, endurance, ROM of core, proximal hip and LE for functional self-care, mobility, lifting and ambulation/stair navigation   [] (26582)Reviewed/Progressed HEP activities related to improving balance, coordination, kinesthetic sense, posture, motor skill, proprioception of core, proximal hip and LE for self care, mobility, lifting, and ambulation/stair navigation      Manual Treatments:  PROM / STM / Oscillations-Mobs:  G-I, II, III, IV (PA's, Inf., Post.)  [x] (58369) Provided manual therapy to mobilize LE, proximal hip and/or LS spine soft tissue/joints for the purpose of modulating pain, promoting relaxation,  increasing ROM, reducing/eliminating soft tissue swelling/inflammation/restriction, improving soft tissue extensibility and allowing for proper ROM for normal function with self care, mobility, lifting and ambulation. Modalities:     [] GAME READY (VASO)- for significant edema, swelling, pain control x 10 min @ conclusion with pillowcase barrier placed and LE elevated on 1 pillow.       Charges:  Timed Code Treatment Minutes: 43   Total Treatment Minutes: 43      [] EVAL (LOW) 42594 (typically 20 minutes face-to-face)  [] EVAL (MOD) 73313 (typically 30 minutes face-to-face)  [] EVAL (HIGH) 78791 (typically 45 minutes face-to-face)  [] RE-EVAL     [x] HA(75754) x  2  [] DRY NEEDLE 1 OR 2 MUSCLES  [] NMR (93409) x     [] DRY NEEDLE 3+ MUSCLES  [x] Manual (21008) x 1      [] TA (45160) x     [] Mech Traction (02491)  [] ES(attended) (93421)     [] ES (un) (98606):   [] VASO (81739)  [] Other:      GOALS:  Patient stated goal: \"get back to working out, skiing, working\"  [] Progressing: [] Met: [x] Not Met: [] Adjusted    Therapist goals for Patient:   Short Term Goals: To be achieved in: 2 weeks  1. Independent in HEP and progression per patient tolerance, in order to prevent re-injury. [] Progressing: [] Met: [x] Not Met: [] Adjusted  2. Patient will have a decrease in pain to facilitate improvement in movement, function, and ADLs as indicated by Functional Deficits. [] Progressing: [] Met: [x] Not Met: [] Adjusted    Long Term Goals: To be achieved in: 12 weeks  1. Disability index score of 70 or higher for the LEFS to assist with reaching prior level of function. [] Progressing: [] Met: [x] Not Met: [] Adjusted  2. Patient will demonstrate increased AROM to full R knee flexion and extension to allow for proper joint functioning as indicated by patients Functional Deficits. [] Progressing: [] Met: [x] Not Met: [] Adjusted  3. Patient will demonstrate an increase in Strength to good proximal hip strength and control, within 5lb HHD in LE to allow for proper functional mobility as indicated by patients Functional Deficits. [] Progressing: [] Met: [x] Not Met: [] Adjusted  4. Patient will return to walking without increased symptoms or restrictions and without functional activities without increased symptoms or restriction. [] Progressing: [] Met: [x] Not Met: [] Adjusted  5. Pt will be able to return to work without increased symptoms or duty restrictions. [] Progressing: [] Met: [x] Not Met: [] Adjusted     ASSESSMENT:  Good tolerance to treatment. Continuing to progress quad strength as able, improved TKE w/ ball into wall vs CC w/ decreased hip compensation. Had some increased peripatellar swelling today, pt to ice at home. Start BFR soon for hip/quad. Pt to continue to benefit from skilled PT to improve functional strength and mobility.     Return to Play: (if applicable)   [x]  Stage 1: Intro to Strength   []  Stage 2: Return to Run and Strength   [] Stage 3: Return to Jump and Strength   []  Stage 4: Dynamic Strength and Agility   []  Stage 5: Sport Specific Training     []  Ready to Return to Play, Meets All Above Stages   []  Not Ready for Return to Sports   Comments:            Treatment/Activity Tolerance:  [x] Patient tolerated treatment well [] Patient limited by fatique  [] Patient limited by pain  [] Patient limited by other medical complications  [] Other:     Overall Progression Towards Functional goals/ Treatment Progress Update:  [x] Patient is progressing as expected towards functional goals listed. [] Progression is slowed due to complexities/Impairments listed. [] Progression has been slowed due to co-morbidities. [] Plan just implemented, too soon to assess goals progression <30days   [] Goals require adjustment due to lack of progress  [] Patient is not progressing as expected and requires additional follow up with physician  [] Other    Prognosis for POC: [x] Good [] Fair  [] Poor    Patient requires continued skilled intervention: [x] Yes  [] No        PLAN: 2x/week until normal ambulation, full ROM, and quad recruitment; decrease to 1x/week due to insurance visit limitations; progress to [facility] for functional progressions  [x] Continue per plan of care [] Alter current plan (see comments)  [] Plan of care initiated [] Hold pending MD visit [] Discharge    Electronically signed by: Ella Betancourt PT    Note: If patient does not return for scheduled/recommended follow up visits, this note will serve as a discharge from care along with the most recent update on progress.

## 2023-03-10 ENCOUNTER — HOSPITAL ENCOUNTER (OUTPATIENT)
Dept: PHYSICAL THERAPY | Age: 43
Setting detail: THERAPIES SERIES
Discharge: HOME | End: 2023-03-10
Payer: COMMERCIAL

## 2023-03-10 PROCEDURE — 97140 MANUAL THERAPY 1/> REGIONS: CPT

## 2023-03-10 PROCEDURE — 97110 THERAPEUTIC EXERCISES: CPT

## 2023-03-10 PROCEDURE — 97112 NEUROMUSCULAR REEDUCATION: CPT

## 2023-03-10 NOTE — FLOWSHEET NOTE
[address] [address] How[address]  Phone: [phone] Fax: [phone]    Physical Therapy Treatment Note/ Progress Report:       Date:  03/10/2023    Patient Name:  Jamila Kaplan    :  1980  MRN: [id number]  Restrictions/Precautions:    Medical/Treatment Diagnosis Information:  Diagnosis: s/p R knee sx 2/3/2023  Treatment Diagnosis: M25.561, M25.661, X75.57  Insurance/Certification information:  PT Insurance Information: [facility]; 20 visits/year; no auth; no ded; OOP not met  Physician Information:  Kal Mir MD  Plan of care signed (Y/N):     Date of Patient follow up with Physician:      Progress Report: []  Yes  [x]  No     Date Range for reporting period:  Beginnin2023  Ending: 10 visits or 30 days    Progress report due (10 Rx/or 30 days whichever is less): 9367     Recertification due (POC duration/ or 90 days whichever is less): 3/8/2023     Visit # Insurance Allowable Auth Needed   10 20 []Yes    [x]No     Latex Allergy:  [x]NO      []YES  Preferred Language for Healthcare:   [x]English       []other:  Functional Scale: LEFS:    Date assessed:2023    Pain level:  0/10     SUBJECTIVE:  Patient reports that her knee is feeling ok, but she is having difficulty getting the knee straight and can't feel her quad engage as well as she'd like. Pt reports that she is 5 weeks post-op.     OBJECTIVE:   Observation: TROM brace unlocked, no crutches around clinic w/ good quad recruitment  Test measurements:      23  ROM: 0-100 deg      RESTRICTIONS/PRECAUTIONS:  s/p R ACLr w/ quad tendon autograft, DOS: 2/3/2023    Exercises/Interventions:     Therapeutic Ex (82589)   Min: 20 Sets/sec Reps Notes/CUES   Retro Stepper/BIKE 5'  Full revolutions   Seated HS stretch 30'' 3    Strap calf stretch   HEP   Quad set 10'' 10 See NMR   Heel slide   HEP   Heel prop      Alter G      BFR      Standing reclined SLR   Max A from PT   3 way SLR 1 10 HEP   SAQ      Clam ABD      Hip Ext /table      BOSU fwd/side lunge 10'' 10    BOSU squat      Leg Press Iso 10'' 10 Iso at 30 deg   Leg press 3 10 70#; 70-10 deg   TKE - ball into wall 10'' 10 Yellow ball   Glute side walks      RDL      Mini squat 10sec 10 At half wall, 40 deg   Slide Lunge 1 15 At half wall   Slide HS eccentrics      Step ups/ecc step down      Swissball wall rolls- in SLS- hip drive      Quad hip ext/wall-ball rolls                  Manual Intervention (27400)  Min: 16      Knee PROM 5'  Flexion/extension   Tib/Fem Mobs      Patella Mobs 3'  gentle   Ankle mobs      STM 8'  Lateral scar massage, quad tendon         NMR re-education (00275)  Min:  10 min   CUES NEEDED   VMS burst 10/10   Quad set into SLR   10:10 biofeedback quad set 6 min  Long-sitting, 3 min with towel under knee, 3 min with towel under heel   G. Med activation      Hip Ext full ROM/ G. Activation      Bosu Bal and Prop- G Med      Single leg stance/Balance/Prop 20'' 3  At 1/2 wall with Airex   Bosu Retro G. Med act      Prone Hip froggers- sliders/elevated            Therapeutic Activity (53265)  Min:       Ladders      Plyos      Dynamic Balance      Brace fitting, adjustments, education 6 min                     Therapeutic Exercise and NMR EXR  [x] (79257) Provided verbal/tactile cueing for activities related to strengthening, flexibility, endurance, ROM for improvements in LE, proximal hip, and core control with self care, mobility, lifting, ambulation. [x] (33926) Provided verbal/tactile cueing for activities related to improving balance, coordination, kinesthetic sense, posture, motor skill, proprioception  to assist with LE, proximal hip, and core control in self care, mobility, lifting, ambulation and eccentric single leg control.      NMR and Therapeutic Activities:    [x] (15748 or 81309) Provided verbal/tactile cueing for activities related to improving balance, coordination, kinesthetic sense, posture, motor skill, proprioception and motor activation to allow for proper function of core, proximal hip and LE with self care and ADLs and functional mobility.   [] (27419) Gait Re-education- Provided training and instruction to the patient for proper LE, core and proximal hip recruitment and positioning and eccentric body weight control with ambulation re-education including up and down stairs     Home Exercise Program:    [x] (68650) Reviewed/Progressed HEP activities related to strengthening, flexibility, endurance, ROM of core, proximal hip and LE for functional self-care, mobility, lifting and ambulation/stair navigation   [] (67697)Reviewed/Progressed HEP activities related to improving balance, coordination, kinesthetic sense, posture, motor skill, proprioception of core, proximal hip and LE for self care, mobility, lifting, and ambulation/stair navigation      Manual Treatments:  PROM / STM / Oscillations-Mobs:  G-I, II, III, IV (PA's, Inf., Post.)  [x] (76038) Provided manual therapy to mobilize LE, proximal hip and/or LS spine soft tissue/joints for the purpose of modulating pain, promoting relaxation,  increasing ROM, reducing/eliminating soft tissue swelling/inflammation/restriction, improving soft tissue extensibility and allowing for proper ROM for normal function with self care, mobility, lifting and ambulation. Modalities:     [x] GAME READY (VASO)- for significant edema, swelling, pain control x 10 min @ conclusion with pillowcase barrier placed and LE elevated on 1 pillow.       Charges:  Timed Code Treatment Minutes: 46   Total Treatment Minutes: 46      [] EVAL (LOW) 54029 (typically 20 minutes face-to-face)  [] EVAL (MOD) 51087 (typically 30 minutes face-to-face)  [] EVAL (HIGH) 34307 (typically 45 minutes face-to-face)  [] RE-EVAL     [x] ROSIO(42217) x  1  [] DRY NEEDLE 1 OR 2 MUSCLES  [x] NMR (65588) x 1     [] DRY NEEDLE 3+ MUSCLES  [x] Manual (51182) x 1      [] TA () x     [] [facility] Traction (48806)  [] ES(attended) (12716)     [] ES (un) (85495):   [] VASO (65041)  [] Other:      GOALS:  Patient stated goal: \"get back to working out, skiing, working\"  [] Progressing: [] Met: [x] Not Met: [] Adjusted    Therapist goals for Patient:   Short Term Goals: To be achieved in: 2 weeks  1. Independent in HEP and progression per patient tolerance, in order to prevent re-injury. [] Progressing: [] Met: [x] Not Met: [] Adjusted  2. Patient will have a decrease in pain to facilitate improvement in movement, function, and ADLs as indicated by Functional Deficits. [] Progressing: [] Met: [x] Not Met: [] Adjusted    Long Term Goals: To be achieved in: 12 weeks  1. Disability index score of 70 or higher for the LEFS to assist with reaching prior level of function. [] Progressing: [] Met: [x] Not Met: [] Adjusted  2. Patient will demonstrate increased AROM to full R knee flexion and extension to allow for proper joint functioning as indicated by patients Functional Deficits. [] Progressing: [] Met: [x] Not Met: [] Adjusted  3. Patient will demonstrate an increase in Strength to good proximal hip strength and control, within 5lb HHD in LE to allow for proper functional mobility as indicated by patients Functional Deficits. [] Progressing: [] Met: [x] Not Met: [] Adjusted  4. Patient will return to walking without increased symptoms or restrictions and without functional activities without increased symptoms or restriction. [] Progressing: [] Met: [x] Not Met: [] Adjusted  5. Pt will be able to return to work without increased symptoms or duty restrictions. [] Progressing: [] Met: [x] Not Met: [] Adjusted     ASSESSMENT:  Good tolerance to treatment with improved knee extension at end of session. Continuing to progress quad strength as able, improved TKE w/ ball into wall vs CC w/ decreased hip compensation. Start BFR soon for hip/quad.  Pt to continue to benefit from skilled PT to improve functional strength and mobility. Return to Play: (if applicable)   [x]  Stage 1: Intro to Strength   []  Stage 2: Return to Run and Strength   []  Stage 3: Return to Jump and Strength   []  Stage 4: Dynamic Strength and Agility   []  Stage 5: Sport Specific Training     []  Ready to Return to Play, Meets All Above Stages   []  Not Ready for Return to Sports   Comments:            Treatment/Activity Tolerance:  [x] Patient tolerated treatment well [] Patient limited by fatique  [] Patient limited by pain  [] Patient limited by other medical complications  [] Other:     Overall Progression Towards Functional goals/ Treatment Progress Update:  [x] Patient is progressing as expected towards functional goals listed. [] Progression is slowed due to complexities/Impairments listed. [] Progression has been slowed due to co-morbidities. [] Plan just implemented, too soon to assess goals progression <30days   [] Goals require adjustment due to lack of progress  [] Patient is not progressing as expected and requires additional follow up with physician  [] Other    Prognosis for POC: [x] Good [] Fair  [] Poor    Patient requires continued skilled intervention: [x] Yes  [] No        PLAN: 2x/week until normal ambulation, full ROM, and quad recruitment; decrease to 1x/week due to insurance visit limitations; progress to [facility] for functional progressions  [x] Continue per plan of care [] Alter current plan (see comments)  [] Plan of care initiated [] Hold pending MD visit [] Discharge    Electronically signed by: Maddison Ware PTA    Note: If patient does not return for scheduled/recommended follow up visits, this note will serve as a discharge from care along with the most recent update on progress.

## 2023-03-13 ENCOUNTER — HOSPITAL ENCOUNTER (OUTPATIENT)
Dept: PHYSICAL THERAPY | Age: 43
Setting detail: THERAPIES SERIES
Discharge: HOME | End: 2023-03-13
Payer: COMMERCIAL

## 2023-03-13 PROCEDURE — 97140 MANUAL THERAPY 1/> REGIONS: CPT

## 2023-03-13 PROCEDURE — 97016 VASOPNEUMATIC DEVICE THERAPY: CPT

## 2023-03-13 PROCEDURE — 97110 THERAPEUTIC EXERCISES: CPT

## 2023-03-13 NOTE — PLAN OF CARE
[place] [address] [address]  Phone: [phone] Fax: [phone]        Physical Therapy Re-Certification Plan of Care/MD UPDATE      Dear  Dr. Zhanna Rodgers,    We had the pleasure of treating the following patient for physical therapy services at [address] [place] [address]. A summary of our findings can be found in the updated assessment below. This includes our plan of care. If you have any questions or concerns regarding these findings, please do not hesitate to contact me at the office phone number checked above.   Thank you for the referral.     Physician Signature:________________________________Date:__________________  By signing above (or electronic signature), therapists plan is approved by physician    Date Range Of Visits: 2023-3/113/2023  Total Visits to Date: 6  Overall Response to Treatment:   [x]Patient is responding well to treatment and improvement is noted with regards  to goals   []Patient should continue to improve in reasonable time if they continue HEP   []Patient has plateaued and is no longer responding to skilled PT intervention    []Patient is getting worse and would benefit from return to referring MD   []Patient unable to adhere to initial POC   []Other:       Physical Therapy Treatment Note/ Progress Report:       Date:  2023    Patient Name:  Karlie Hicks    :  1980  MRN: [id number]  Restrictions/Precautions:    Medical/Treatment Diagnosis Information:  Diagnosis: s/p R knee sx 2/3/2023  Treatment Diagnosis: M25.561, M25.661, C40.38  Insurance/Certification information:  PT Insurance Information: [facility]; 20 visits/year; no auth; no ded; OOP not met  Physician Information:  Lilliam Turk MD  Plan of care signed (Y/N):     Date of Patient follow up with Physician:      Progress Report: [x]  Yes  []  No     Date Range for reporting period:  Beginning: 3/13/2023  Endin visits or 30 days    Progress report due (10 Rx/or 30 days whichever is less): 3539    Recertification due (POC duration/ or 90 days whichever is less): 2023    Visit # Insurance Allowable Auth Needed   11 20 []Yes    [x]No     Latex Allergy:  [x]NO      []YES  Preferred Language for Healthcare:   [x]English       []other:  Functional Scale: LEFS: /80   Date assessed: 3/13/2023    Pain level:  0/10     SUBJECTIVE:  Patient reports knee is feeling okay but admits to not doing much over the weekend and feeling tight today due to being in the hospital with one of her children.     OBJECTIVE:   Observation:   Test measurements:      3/13/2023  ROM: 0-131 deg      RESTRICTIONS/PRECAUTIONS:  s/p R ACLr w/ quad tendon autograft, DOS: 2/3/2023    Exercises/Interventions:     Therapeutic Ex (71142)   Min: 30 Sets/sec Reps Notes/CUES   Retro Stepper/BIKE 5'  Full revolutions   Seated HS stretch 30'' 3    Strap calf stretch   HEP   Quad set 10'' 10 See NMR   Heel slide   HEP   Heel prop      Alter G      BFR      Standing reclined SLR   Max A from PT   3 way SLR 1 10 HEP   SAQ      Clam ABD      Hip Ext /table      BOSU fwd/side lunge 10'' 10    BOSU squat      Leg Press Iso 10'' 10 Iso at 30 deg   Leg press 2-1 3 12 75#   TKE - ball into wall 10'' 10 Yellow ball   Glute side walks 2 15' Yellow AK   RDL      Mini squat 10sec 10 At half wall, 40 deg   Slide Lunge 1 15 At half wall   Slide HS eccentrics      Step ups/ecc step down      Swissball wall rolls- in SLS- hip drive      Quad hip ext/wall-ball rolls                  Manual Intervention (76063)  Min: 16      Knee PROM 5'  Flexion/extension   Tib/Fem Mobs      Patella Mobs 3'  gentle   Ankle mobs      STM 8'  Quad tendon; distal IT band         NMR re-education (89367)  Min:  10 min   CUES NEEDED   VMS burst 10/10   Quad set into SLR   10:10 biofeedback quad set 6 min  Long-sitting, 3 min with towel under knee, 3 min with towel under heel G. Med activation      Hip Ext full ROM/ G. Activation      Bosu Bal and Prop- G Med      Single leg stance/Balance/Prop 20'' 3  At 1/2 wall with Airex   Bosu Retro G. Med act      Prone Hip froggers- sliders/elevated            Therapeutic Activity (37394)  Min: 5      Ladders      Plyos      Dynamic Balance      Partial squat to box 2 15 Blue KB to 12 in step   Step up 1 15 8''             Therapeutic Exercise and NMR EXR  [x] (51559) Provided verbal/tactile cueing for activities related to strengthening, flexibility, endurance, ROM for improvements in LE, proximal hip, and core control with self care, mobility, lifting, ambulation. [x] (62859) Provided verbal/tactile cueing for activities related to improving balance, coordination, kinesthetic sense, posture, motor skill, proprioception  to assist with LE, proximal hip, and core control in self care, mobility, lifting, ambulation and eccentric single leg control.      NMR and Therapeutic Activities:    [x] (03604 or 35151) Provided verbal/tactile cueing for activities related to improving balance, coordination, kinesthetic sense, posture, motor skill, proprioception and motor activation to allow for proper function of core, proximal hip and LE with self care and ADLs and functional mobility.   [] (49772) Gait Re-education- Provided training and instruction to the patient for proper LE, core and proximal hip recruitment and positioning and eccentric body weight control with ambulation re-education including up and down stairs     Home Exercise Program:    [x] (34490) Reviewed/Progressed HEP activities related to strengthening, flexibility, endurance, ROM of core, proximal hip and LE for functional self-care, mobility, lifting and ambulation/stair navigation   [] (34739)Reviewed/Progressed HEP activities related to improving balance, coordination, kinesthetic sense, posture, motor skill, proprioception of core, proximal hip and LE for self care, mobility, lifting, and ambulation/stair navigation      Manual Treatments:  PROM / STM / Oscillations-Mobs:  G-I, II, III, IV (PA's, Inf., Post.)  [x] (96512) Provided manual therapy to mobilize LE, proximal hip and/or LS spine soft tissue/joints for the purpose of modulating pain, promoting relaxation,  increasing ROM, reducing/eliminating soft tissue swelling/inflammation/restriction, improving soft tissue extensibility and allowing for proper ROM for normal function with self care, mobility, lifting and ambulation. Modalities:     [x] GAME READY (VASO)- for significant edema, swelling, pain control x 10 min for suprapatellar swelling    Charges:  Timed Code Treatment Minutes: 46   Total Treatment Minutes: 56      [] EVAL (LOW) 41751 (typically 20 minutes face-to-face)  [] EVAL (MOD) 44541 (typically 30 minutes face-to-face)  [] EVAL (HIGH) 80915 (typically 45 minutes face-to-face)  [] RE-EVAL     [x] TX(69592) x  2  [] DRY NEEDLE 1 OR 2 MUSCLES  [] NMR (87420) x 1     [] DRY NEEDLE 3+ MUSCLES  [x] Manual (17013) x 1      [] TA (86702) x     [] Mech Traction (70019)  [] ES(attended) (63930)     [] ES (un) (31159):   [x] VASO (94932)  [] Other:      GOALS:  Patient stated goal: \"get back to working out, skiing, working\"  [] Progressing: [] Met: [x] Not Met: [] Adjusted    Therapist goals for Patient:   Short Term Goals: To be achieved in: 2 weeks  1. Independent in HEP and progression per patient tolerance, in order to prevent re-injury. [] Progressing: [x] Met: [] Not Met: [] Adjusted  2. Patient will have a decrease in pain to facilitate improvement in movement, function, and ADLs as indicated by Functional Deficits. [] Progressing: [x] Met: [] Not Met: [] Adjusted    Long Term Goals: To be achieved in: 12 weeks  1. Disability index score of 70 or higher for the LEFS to assist with reaching prior level of function. [x] Progressing: [] Met: [] Not Met: [] Adjusted  2.  Patient will demonstrate increased AROM to full R knee flexion and extension to allow for proper joint functioning as indicated by patients Functional Deficits. [] Progressing: [x] Met: [] Not Met: [] Adjusted  3. Patient will demonstrate an increase in Strength to good proximal hip strength and control, within 5lb HHD in LE to allow for proper functional mobility as indicated by patients Functional Deficits. [] Progressing: [] Met: [] Not Met: [] Adjusted  4. Patient will return to walking without increased symptoms or restrictions and without functional activities without increased symptoms or restriction. [] Progressing: [x] Met: [] Not Met: [] Adjusted  5. Pt will be able to return to work without increased symptoms or duty restrictions. [] Progressing: [] Met: [x] Not Met: [] Adjusted     ASSESSMENT:  Knee flexion initially limited improved w/ heel slides for easy 131 AROM flexion. Suprapatellar swelling improved w/ STM and quad activation, progressing functional strength slowly and as able. Pt to continue to benefit from skilled PT to improve functional strength and mobility. Return to Play: (if applicable)   [x]  Stage 1: Intro to Strength   []  Stage 2: Return to Run and Strength   []  Stage 3: Return to Jump and Strength   []  Stage 4: Dynamic Strength and Agility   []  Stage 5: Sport Specific Training     []  Ready to Return to Play, Meets All Above Stages   []  Not Ready for Return to Sports   Comments:            Treatment/Activity Tolerance:  [x] Patient tolerated treatment well [] Patient limited by fatique  [] Patient limited by pain  [] Patient limited by other medical complications  [] Other:     Overall Progression Towards Functional goals/ Treatment Progress Update:  [x] Patient is progressing as expected towards functional goals listed. [] Progression is slowed due to complexities/Impairments listed. [] Progression has been slowed due to co-morbidities.   [] Plan just implemented, too soon to assess goals progression <30days   [] Goals require adjustment due to lack of progress  [] Patient is not progressing as expected and requires additional follow up with physician  [] Other    Prognosis for POC: [x] Good [] Fair  [] Poor    Patient requires continued skilled intervention: [x] Yes  [] No        PLAN: 2x/week until normal ambulation, full ROM, and quad recruitment; decrease to 1x/week due to insurance visit limitations; progress to [facility] for functional progressions  [x] Continue per plan of care [] Alter current plan (see comments)  [] Plan of care initiated [] Hold pending MD visit [] Discharge    Electronically signed by: Tyrese Ortiz PT    Note: If patient does not return for scheduled/recommended follow up visits, this note will serve as a discharge from care along with the most recent update on progress.

## 2023-03-16 ENCOUNTER — HOSPITAL ENCOUNTER (OUTPATIENT)
Dept: PHYSICAL THERAPY | Age: 43
Setting detail: THERAPIES SERIES
Discharge: HOME | End: 2023-03-16
Payer: COMMERCIAL

## 2023-03-16 PROCEDURE — 97530 THERAPEUTIC ACTIVITIES: CPT

## 2023-03-16 PROCEDURE — 97140 MANUAL THERAPY 1/> REGIONS: CPT

## 2023-03-16 PROCEDURE — 97110 THERAPEUTIC EXERCISES: CPT

## 2023-03-16 NOTE — FLOWSHEET NOTE
[place] [address] [facility] [address]  Phone: [phone] Fax: [phone]      Physical Therapy Treatment Note/ Progress Report:       Date:  2023    Patient Name:  Odalys Shelley    :  1980  MRN: [id number]  Restrictions/Precautions:    Medical/Treatment Diagnosis Information:  Diagnosis: s/p R knee sx 2/3/2023  Treatment Diagnosis: M25.561, M25.661, H49.46  Insurance/Certification information:  PT Insurance Information: [facility]; 20 visits/year; no auth; no ded; OOP not met  Physician Information:  Cesar Jaffe MD  Plan of care signed (Y/N):     Date of Patient follow up with Physician:      Progress Report: []  Yes  [x]  No     Date Range for reporting period:  Beginning: 3/13/2023  Endin visits or 30 days    Progress report due (10 Rx/or 30 days whichever is less): 5822    Recertification due (POC duration/ or 90 days whichever is less): 2023    Visit # Insurance Allowable Auth Needed   12 20 []Yes    [x]No     Latex Allergy:  [x]NO      []YES  Preferred Language for Healthcare:   [x]English       []other:  Functional Scale: LEFS: /80   Date assessed: 3/13/2023    Pain level:  0/10     SUBJECTIVE:  Patient knee is doing well, feels like she still has some swelling around the incision.      OBJECTIVE:   Observation:   Test measurements:      3/13/2023  ROM: 0-131 deg      RESTRICTIONS/PRECAUTIONS:  s/p R ACLr w/ quad tendon autograft, DOS: 2/3/2023    Exercises/Interventions:     Therapeutic Ex ()   Min: 25 Sets/sec Reps Notes/CUES   Retro Stepper/BIKE 5'  Full revolutions   Seated HS stretch 30'' 3    Strap calf stretch   HEP   Quad set 10'' 10 See NMR   Heel slide   HEP   Heel prop      Alter G      BFR      Standing reclined SLR   Max A from PT   3 way SLR 1 10 HEP   SAQ      Clam ABD      Hip Ext /table      BOSU fwd/side lunge 10'' 10    BOSU squat      Leg Press Iso 10'' 10 Iso at 30 deg   Leg press SL 3 12 65#   TKE - ball into wall 10'' 10 Yellow ball   Glute side walks 2 15' Yellow AK   RDL      Mini squat   At half wall, 40 deg   Slide Lunge 1 15 At half wall   Slide HS eccentrics      Step ups/ecc step down      Swissball wall rolls- in SLS- hip drive      Quad hip ext/wall-ball rolls                  Manual Intervention (40296)  Min: 16      Knee PROM 5'  End range Flexion/extension to neutral   Tib/Fem Mobs      Patella Mobs 3'  gentle   Ankle mobs      STM 8'  Quad tendon; distal IT band         NMR re-education (55167)  Min:  10 min   CUES NEEDED   VMS burst 10/10   Quad set into SLR   10:10 biofeedback quad set 6 min  Long-sitting, 3 min with towel under knee, 3 min with towel under heel   G. Med activation      Hip Ext full ROM/ G. Activation      Bosu Bal and Prop- G Med      Single leg stance/Balance/Prop 20'' 3  At 1/2 wall with Airex   Bosu Retro G. Med act      Prone Hip froggers- sliders/elevated            Therapeutic Activity (13796)  Min: 10      Ladders      Plyos      Dynamic Balance      STS in chair 2 15 Blue KB   Step up 2 15 8''             Therapeutic Exercise and NMR EXR  [x] (42383) Provided verbal/tactile cueing for activities related to strengthening, flexibility, endurance, ROM for improvements in LE, proximal hip, and core control with self care, mobility, lifting, ambulation.  [x] (90629) Provided verbal/tactile cueing for activities related to improving balance, coordination, kinesthetic sense, posture, motor skill, proprioception  to assist with LE, proximal hip, and core control in self care, mobility, lifting, ambulation and eccentric single leg control.     NMR and Therapeutic Activities:    [x] (93019 or 53459) Provided verbal/tactile cueing for activities related to improving balance, coordination, kinesthetic sense, posture, motor skill, proprioception and motor activation to allow for proper function of core, proximal hip and LE with self care and ADLs and  functional mobility.   [] (07451) Gait Re-education- Provided training and instruction to the patient for proper LE, core and proximal hip recruitment and positioning and eccentric body weight control with ambulation re-education including up and down stairs     Home Exercise Program:    [x] (36590) Reviewed/Progressed HEP activities related to strengthening, flexibility, endurance, ROM of core, proximal hip and LE for functional self-care, mobility, lifting and ambulation/stair navigation   [] (16792)Reviewed/Progressed HEP activities related to improving balance, coordination, kinesthetic sense, posture, motor skill, proprioception of core, proximal hip and LE for self care, mobility, lifting, and ambulation/stair navigation      Manual Treatments:  PROM / STM / Oscillations-Mobs:  G-I, II, III, IV (PA's, Inf., Post.)  [x] (34775) Provided manual therapy to mobilize LE, proximal hip and/or LS spine soft tissue/joints for the purpose of modulating pain, promoting relaxation,  increasing ROM, reducing/eliminating soft tissue swelling/inflammation/restriction, improving soft tissue extensibility and allowing for proper ROM for normal function with self care, mobility, lifting and ambulation.      Modalities:     [x] GAME READY (VASO)- for significant edema, swelling, pain control x 10 min for suprapatellar swelling    Charges:  Timed Code Treatment Minutes: 50   Total Treatment Minutes: 50      [] EVAL (LOW) 89228 (typically 20 minutes face-to-face)  [] EVAL (MOD) 64569 (typically 30 minutes face-to-face)  [] EVAL (HIGH) 55043 (typically 45 minutes face-to-face)  [] RE-EVAL     [x] IC(53110) x  1  [] DRY NEEDLE 1 OR 2 MUSCLES  [] NMR (92038) x 1     [] DRY NEEDLE 3+ MUSCLES  [x] Manual (24041) x 1      [x] TA (71851) x  1   [] Mech Traction (81103)  [] ES(attended) (22889)     [] ES (un) (49792):   [] VASO (19038)  [] Other:      GOALS:  Patient stated goal: \"get back to working out, skiing, working\"  [] Progressing: [] Met: [x] Not Met: [] Adjusted    Therapist goals for Patient:   Short Term Goals: To be achieved in: 2 weeks  1. Independent in HEP and progression per patient tolerance, in order to prevent re-injury. [] Progressing: [x] Met: [] Not Met: [] Adjusted  2. Patient will have a decrease in pain to facilitate improvement in movement, function, and ADLs as indicated by Functional Deficits. [] Progressing: [x] Met: [] Not Met: [] Adjusted    Long Term Goals: To be achieved in: 12 weeks  1. Disability index score of 70 or higher for the LEFS to assist with reaching prior level of function. [x] Progressing: [] Met: [] Not Met: [] Adjusted  2. Patient will demonstrate increased AROM to full R knee flexion and extension to allow for proper joint functioning as indicated by patients Functional Deficits. [] Progressing: [x] Met: [] Not Met: [] Adjusted  3. Patient will demonstrate an increase in Strength to good proximal hip strength and control, within 5lb HHD in LE to allow for proper functional mobility as indicated by patients Functional Deficits. [] Progressing: [] Met: [] Not Met: [] Adjusted  4. Patient will return to walking without increased symptoms or restrictions and without functional activities without increased symptoms or restriction. [] Progressing: [x] Met: [] Not Met: [] Adjusted  5. Pt will be able to return to work without increased symptoms or duty restrictions. [] Progressing: [] Met: [x] Not Met: [] Adjusted     ASSESSMENT:  Continue to address end range mobility limitations for full ROM and functional strength. Pt to continue to benefit from skilled PT to improve functional strength and mobility.     Return to Play: (if applicable)   [x]  Stage 1: Intro to Strength   []  Stage 2: Return to Run and Strength   []  Stage 3: Return to Jump and Strength   []  Stage 4: Dynamic Strength and Agility   []  Stage 5: Sport Specific Training     []  Ready to Return to Play, Meets All Above Stages   [] Not Ready for Return to Sports   Comments:            Treatment/Activity Tolerance:  [x] Patient tolerated treatment well [] Patient limited by fatique  [] Patient limited by pain  [] Patient limited by other medical complications  [] Other:     Overall Progression Towards Functional goals/ Treatment Progress Update:  [x] Patient is progressing as expected towards functional goals listed. [] Progression is slowed due to complexities/Impairments listed. [] Progression has been slowed due to co-morbidities. [] Plan just implemented, too soon to assess goals progression <30days   [] Goals require adjustment due to lack of progress  [] Patient is not progressing as expected and requires additional follow up with physician  [] Other    Prognosis for POC: [x] Good [] Fair  [] Poor    Patient requires continued skilled intervention: [x] Yes  [] No        PLAN: 2x/week until normal ambulation, full ROM, and quad recruitment; decrease to 1x/week due to insurance visit limitations; progress to [facility] for functional progressions  [x] Continue per plan of care [] Alter current plan (see comments)  [] Plan of care initiated [] Hold pending MD visit [] Discharge    Electronically signed by: Kenisha Blackwell PT    Note: If patient does not return for scheduled/recommended follow up visits, this note will serve as a discharge from care along with the most recent update on progress.

## 2023-03-21 ENCOUNTER — HOSPITAL ENCOUNTER (OUTPATIENT)
Dept: PHYSICAL THERAPY | Age: 43
Setting detail: THERAPIES SERIES
Discharge: HOME | End: 2023-03-21
Payer: COMMERCIAL

## 2023-03-21 PROCEDURE — 97110 THERAPEUTIC EXERCISES: CPT

## 2023-03-21 PROCEDURE — 97140 MANUAL THERAPY 1/> REGIONS: CPT

## 2023-03-21 NOTE — FLOWSHEET NOTE
goal: \"get back to working out, skiing, working\"  [] Progressing: [] Met: [x] Not Met: [] Adjusted    Therapist goals for Patient:   Short Term Goals: To be achieved in: 2 weeks  1. Independent in HEP and progression per patient tolerance, in order to prevent re-injury. [] Progressing: [x] Met: [] Not Met: [] Adjusted  2. Patient will have a decrease in pain to facilitate improvement in movement, function, and ADLs as indicated by Functional Deficits. [] Progressing: [x] Met: [] Not Met: [] Adjusted    Long Term Goals: To be achieved in: 12 weeks  1. Disability index score of 70 or higher for the LEFS to assist with reaching prior level of function. [x] Progressing: [] Met: [] Not Met: [] Adjusted  2. Patient will demonstrate increased AROM to full R knee flexion and extension to allow for proper joint functioning as indicated by patients Functional Deficits. [] Progressing: [x] Met: [] Not Met: [] Adjusted  3. Patient will demonstrate an increase in Strength to good proximal hip strength and control, within 5lb HHD in LE to allow for proper functional mobility as indicated by patients Functional Deficits. [] Progressing: [] Met: [] Not Met: [] Adjusted  4. Patient will return to walking without increased symptoms or restrictions and without functional activities without increased symptoms or restriction. [] Progressing: [x] Met: [] Not Met: [] Adjusted  5. Pt will be able to return to work without increased symptoms or duty restrictions. [] Progressing: [] Met: [x] Not Met: [] Adjusted     ASSESSMENT:  Continue to address end range mobility limitations for full ROM and functional strength, able to achieve full active extension. Pt to continue to benefit from skilled PT to improve functional strength and mobility.     Return to Play: (if applicable)   [x]  Stage 1: Intro to Strength   []  Stage 2: Return to Run and Strength   []  Stage 3: Return to Jump and Strength   []  Stage 4: Dynamic Strength and

## 2023-03-24 ENCOUNTER — HOSPITAL ENCOUNTER (OUTPATIENT)
Dept: PHYSICAL THERAPY | Age: 43
Setting detail: THERAPIES SERIES
Discharge: HOME | End: 2023-03-24
Payer: COMMERCIAL

## 2023-03-24 PROCEDURE — 97140 MANUAL THERAPY 1/> REGIONS: CPT

## 2023-03-24 PROCEDURE — 97110 THERAPEUTIC EXERCISES: CPT

## 2023-03-24 NOTE — FLOWSHEET NOTE
activation to allow for proper function of core, proximal hip and LE with self care and ADLs and functional mobility.   [] (01790) Gait Re-education- Provided training and instruction to the patient for proper LE, core and proximal hip recruitment and positioning and eccentric body weight control with ambulation re-education including up and down stairs     Home Exercise Program:    [x] (74956) Reviewed/Progressed HEP activities related to strengthening, flexibility, endurance, ROM of core, proximal hip and LE for functional self-care, mobility, lifting and ambulation/stair navigation   [] (66145)Reviewed/Progressed HEP activities related to improving balance, coordination, kinesthetic sense, posture, motor skill, proprioception of core, proximal hip and LE for self care, mobility, lifting, and ambulation/stair navigation      Manual Treatments:  PROM / STM / Oscillations-Mobs:  G-I, II, III, IV (PA's, Inf., Post.)  [x] (55234) Provided manual therapy to mobilize LE, proximal hip and/or LS spine soft tissue/joints for the purpose of modulating pain, promoting relaxation,  increasing ROM, reducing/eliminating soft tissue swelling/inflammation/restriction, improving soft tissue extensibility and allowing for proper ROM for normal function with self care, mobility, lifting and ambulation.      Modalities:     [x] GAME READY (VASO)- for significant edema, swelling, pain control x 10 min for suprapatellar swelling    Charges:  Timed Code Treatment Minutes: 50   Total Treatment Minutes: 50      [] EVAL (LOW) 21336 (typically 20 minutes face-to-face)  [] EVAL (MOD) 20981 (typically 30 minutes face-to-face)  [] EVAL (HIGH) 47118 (typically 45 minutes face-to-face)  [] RE-EVAL     [x] ZN(41583) x  2  [] DRY NEEDLE 1 OR 2 MUSCLES  [] NMR (69111) x 1     [] DRY NEEDLE 3+ MUSCLES  [x] Manual (45499) x 1      [] TA (87460) x  1   [] Mech Traction (64604)  [] ES(attended) (42801)     [] ES (un) (42575):   [] VASO (18064)  []

## 2023-03-28 ENCOUNTER — HOSPITAL ENCOUNTER (OUTPATIENT)
Dept: PHYSICAL THERAPY | Age: 43
Setting detail: THERAPIES SERIES
Discharge: HOME | End: 2023-03-28
Payer: COMMERCIAL

## 2023-03-28 PROCEDURE — 97140 MANUAL THERAPY 1/> REGIONS: CPT

## 2023-03-28 PROCEDURE — 97110 THERAPEUTIC EXERCISES: CPT

## 2023-03-28 NOTE — FLOWSHEET NOTE
activation to allow for proper function of core, proximal hip and LE with self care and ADLs and functional mobility.   [] (91029) Gait Re-education- Provided training and instruction to the patient for proper LE, core and proximal hip recruitment and positioning and eccentric body weight control with ambulation re-education including up and down stairs     Home Exercise Program:    [x] (62031) Reviewed/Progressed HEP activities related to strengthening, flexibility, endurance, ROM of core, proximal hip and LE for functional self-care, mobility, lifting and ambulation/stair navigation   [] (42379)Reviewed/Progressed HEP activities related to improving balance, coordination, kinesthetic sense, posture, motor skill, proprioception of core, proximal hip and LE for self care, mobility, lifting, and ambulation/stair navigation      Manual Treatments:  PROM / STM / Oscillations-Mobs:  G-I, II, III, IV (PA's, Inf., Post.)  [x] (38056) Provided manual therapy to mobilize LE, proximal hip and/or LS spine soft tissue/joints for the purpose of modulating pain, promoting relaxation,  increasing ROM, reducing/eliminating soft tissue swelling/inflammation/restriction, improving soft tissue extensibility and allowing for proper ROM for normal function with self care, mobility, lifting and ambulation.      Modalities:     [x] GAME READY (VASO)- for significant edema, swelling, pain control x 10 min for suprapatellar swelling    Charges:  Timed Code Treatment Minutes: 50   Total Treatment Minutes: 50      [] EVAL (LOW) 63272 (typically 20 minutes face-to-face)  [] EVAL (MOD) 33021 (typically 30 minutes face-to-face)  [] EVAL (HIGH) 85383 (typically 45 minutes face-to-face)  [] RE-EVAL     [x] RB(30142) x  2  [] DRY NEEDLE 1 OR 2 MUSCLES  [] NMR (26822) x 1     [] DRY NEEDLE 3+ MUSCLES  [x] Manual (79578) x 1      [] TA (63894) x  1   [] Mech Traction (33477)  [] ES(attended) (23553)     [] ES (un) (50164):   [] VASO (80438)  []

## 2023-03-31 ENCOUNTER — HOSPITAL ENCOUNTER (OUTPATIENT)
Dept: PHYSICAL THERAPY | Age: 43
Setting detail: THERAPIES SERIES
Discharge: HOME | End: 2023-03-31
Payer: COMMERCIAL

## 2023-03-31 PROCEDURE — 97110 THERAPEUTIC EXERCISES: CPT

## 2023-03-31 PROCEDURE — 97530 THERAPEUTIC ACTIVITIES: CPT

## 2023-03-31 NOTE — FLOWSHEET NOTE
fwd/side lunge 10'' 10 4in box under L foot   BOSU squat      Leg Press Iso   Iso at 30 deg   Leg press SL 3 12 70#, 5-10 reps in reserve   TKE - ball into wall 10'' 10 Yellow ball   Glute side walks 2 15' Yellow AK   RDL 2 12 1 Blue KB, kickstand position with L LE   Mini squat   At half wall, 40 deg   Slide Lunge 2 15 Posterior and lateral   Slide HS eccentrics      Step ups/ecc step down      Swissball wall rolls- in SLS- hip drive      Quad hip ext/wall-ball rolls                  Manual Intervention (36864)  Min: 6      Knee PROM 3'  End range Flexion/extension to neutral   Tib/Fem Mobs '  Posteromedial corner; end range ext   Patella Mobs 3'  gentle   Ankle mobs      STM '  Quad tendon; distal IT band         NMR re-education (55979)  Min:  0 min   CUES NEEDED   VMS burst 10/10   Quad set into SLR   10:10 biofeedback quad set 6 min  Long-sitting, 3 min with towel under knee, 3 min with towel under heel   G. Med activation      Hip Ext full ROM/ G. Activation      Bosu Bal and Prop- G Med      Single leg stance/Balance/Prop 20'' 3  At 1/2 wall with Airex   Bosu Retro G. Med act      Prone Hip froggers- sliders/elevated            Therapeutic Activity (50031)  Min: 12      Ladders      Plyos      Dynamic Balance      STS in chair 2 15 Blue KB, 2in step under R LE   Step up 2 15 8''             Therapeutic Exercise and NMR EXR  [x] (19983) Provided verbal/tactile cueing for activities related to strengthening, flexibility, endurance, ROM for improvements in LE, proximal hip, and core control with self care, mobility, lifting, ambulation. [x] (35422) Provided verbal/tactile cueing for activities related to improving balance, coordination, kinesthetic sense, posture, motor skill, proprioception  to assist with LE, proximal hip, and core control in self care, mobility, lifting, ambulation and eccentric single leg control.      NMR and Therapeutic Activities:    [x] (46286 or 95406) Provided verbal/tactile cueing for

## 2023-04-04 ENCOUNTER — HOSPITAL ENCOUNTER (OUTPATIENT)
Dept: PHYSICAL THERAPY | Age: 43
Setting detail: THERAPIES SERIES
Discharge: HOME | End: 2023-04-04
Payer: COMMERCIAL

## 2023-04-04 PROCEDURE — 97110 THERAPEUTIC EXERCISES: CPT

## 2023-04-04 PROCEDURE — 97530 THERAPEUTIC ACTIVITIES: CPT

## 2023-04-04 PROCEDURE — 97140 MANUAL THERAPY 1/> REGIONS: CPT

## 2023-04-04 NOTE — FLOWSHEET NOTE
[place] [address] [facility] [address]  Phone: [phone] Fax: [phone]      Physical Therapy Treatment Note/ Progress Report:       Date:  2023    Patient Name:  Tati Arana    :  1980  MRN: [id number]  Restrictions/Precautions:    Medical/Treatment Diagnosis Information:  Diagnosis: s/p R knee sx 2/3/2023  Treatment Diagnosis: M25.561, M25.661, X96.44  Insurance/Certification information:  PT Insurance Information: [facility]; 20 visits/year; no auth; no ded; OOP not met; $35 COPAY  Physician Information:  Martin Staples MD  Plan of care signed (Y/N):     Date of Patient follow up with Physician:      Progress Report: []  Yes  [x]  No     Date Range for reporting period:  Beginning: 3/13/2023  Endin visits or 30 days    Progress report due (10 Rx/or 30 days whichever is less):     Recertification due (POC duration/ or 90 days whichever is less): 2023    Visit # Insurance Allowable Auth Needed   17 20 PER YEAR  *TOTAL OF 37 VISITS COVERED DUE TO INSURANCE CHANGE AS OF * []Yes    [x]No     Latex Allergy:  [x]NO      []YES  Preferred Language for Healthcare:   [x]English       []other:  Functional Scale: LEFS: 42/80   Date assessed: 3/13/2023    Pain level:  0/10     SUBJECTIVE:  Patient knee is doing well. 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      OBJECTIVE:   Observation:   Test measurements:      3/13/2023  ROM: 0-131 deg      RESTRICTIONS/PRECAUTIONS:  s/p R ACLr w/ quad tendon autograft, DOS: 2/3/2023    Exercises/Interventions:     Therapeutic Ex (19269)   Min: 25 Sets/sec Reps Notes/CUES   Retro Stepper/BIKE 5'  Full revolutions   Seated HS stretch 30'' 3 reviewed   Strap calf stretch   HEP   BFR 8'  SLR, SL ABD, LAQ, HR, mini squat, step up 8in  80%; green cuff   Standing reclined SLR   Max

## 2023-04-07 ENCOUNTER — APPOINTMENT (OUTPATIENT)
Dept: PHYSICAL THERAPY | Age: 43
End: 2023-04-07
Payer: COMMERCIAL

## 2023-04-14 ENCOUNTER — APPOINTMENT (OUTPATIENT)
Dept: PHYSICAL THERAPY | Age: 43
End: 2023-04-14
Payer: COMMERCIAL

## 2023-04-19 ENCOUNTER — HOSPITAL ENCOUNTER (OUTPATIENT)
Dept: PHYSICAL THERAPY | Age: 43
Setting detail: THERAPIES SERIES
Discharge: HOME | End: 2023-04-19
Payer: COMMERCIAL

## 2023-04-19 PROCEDURE — 97140 MANUAL THERAPY 1/> REGIONS: CPT

## 2023-04-19 PROCEDURE — 97110 THERAPEUTIC EXERCISES: CPT

## 2023-04-19 NOTE — FLOWSHEET NOTE
and eccentric control, pt doing very well for timeline. Will cont to benefit from skilled PT interventions to progress to PLOF. Return to Play: (if applicable)   [x]  Stage 1: Intro to Strength   []  Stage 2: Return to Run and Strength   []  Stage 3: Return to Jump and Strength   []  Stage 4: Dynamic Strength and Agility   []  Stage 5: Sport Specific Training     []  Ready to Return to Play, Meets All Above Stages   []  Not Ready for Return to Sports   Comments:            Treatment/Activity Tolerance:  [x] Patient tolerated treatment well [] Patient limited by fatique  [] Patient limited by pain  [] Patient limited by other medical complications  [] Other:     Overall Progression Towards Functional goals/ Treatment Progress Update:  [x] Patient is progressing as expected towards functional goals listed. [] Progression is slowed due to complexities/Impairments listed. [] Progression has been slowed due to co-morbidities. [] Plan just implemented, too soon to assess goals progression <30days   [] Goals require adjustment due to lack of progress  [] Patient is not progressing as expected and requires additional follow up with physician  [] Other    Prognosis for POC: [x] Good [] Fair  [] Poor    Patient requires continued skilled intervention: [x] Yes  [] No        PLAN: 2x/week for functional strength and balance  [x] Continue per plan of care [] Alter current plan (see comments)  [] Plan of care initiated [] Hold pending MD visit [] Discharge    Electronically signed by: Estrada Ha PT DPT  Note: If patient does not return for scheduled/recommended follow up visits, this note will serve as a discharge from care along with the most recent update on progress. Patient/Caregiver provided printed discharge information.

## 2023-04-21 ENCOUNTER — HOSPITAL ENCOUNTER (OUTPATIENT)
Dept: PHYSICAL THERAPY | Age: 43
Setting detail: THERAPIES SERIES
Discharge: HOME | End: 2023-04-21
Payer: COMMERCIAL

## 2023-04-21 PROCEDURE — 97110 THERAPEUTIC EXERCISES: CPT

## 2023-04-21 PROCEDURE — 97140 MANUAL THERAPY 1/> REGIONS: CPT

## 2023-04-21 NOTE — FLOWSHEET NOTE
[place] [address] [address] [place] [address]  Phone: [phone] Fax: [phone]      Physical Therapy Treatment Note/ Progress Report:       Date:  2023    Patient Name:  Joe Campos    :  1980  MRN: [id number]  Restrictions/Precautions:    Medical/Treatment Diagnosis Information:  Diagnosis: s/p R knee sx 2/3/2023  Treatment Diagnosis: M25.561, M25.661, Q74.91  Insurance/Certification information:  PT Insurance Information: [facility]; 20 visits/year; no auth; no ded; OOP not met; $35 COPAY  Physician Information:  Landry Frye MD  Plan of care signed (Y/N):     Date of Patient follow up with Physician:      Progress Report: []  Yes  [x]  No     Date Range for reporting period:  Beginning: 3/13/2023  Endin visits or 30 days    Progress report due (10 Rx/or 30 days whichever is less):     Recertification due (POC duration/ or 90 days whichever is less): 2023    Visit # Insurance Allowable Auth Needed   21 20 PER YEAR  *TOTAL OF 37 VISITS COVERED DUE TO INSURANCE CHANGE AS OF * []Yes    [x]No     Latex Allergy:  [x]NO      []YES  Preferred Language for Healthcare:   [x]English       []other:  Functional Scale: LEFS: 42/80   Date assessed: 3/13/2023    Pain level:  0/10     SUBJECTIVE:  Patient states knee is doing well. States that she has been feeling better since the fall.      OBJECTIVE:   Observation:   Test measurements:      3/13/2023  ROM: 0-131 deg      RESTRICTIONS/PRECAUTIONS:  s/p R ACLr w/ quad tendon autograft, DOS: 2/3/2023    Exercises/Interventions:     Therapeutic Ex (32014)   Min: 25 Sets/sec Reps Notes/CUES   Retro Stepper/BIKE 5'  Full revolutions   Seated HS stretch 30'' 3 reviewed   Strap calf stretch   HEP   BFR 8'  SLR, SL ABD, LAQ, HR, mini squat, step up 8in  80%; green cuff   Standing reclined SLR   Max A from PT   3 way SLR   HEP   SAQ      Cybex HS curl - double leg 3 8-10

## 2023-04-25 ENCOUNTER — HOSPITAL ENCOUNTER (OUTPATIENT)
Dept: PHYSICAL THERAPY | Age: 43
Setting detail: THERAPIES SERIES
Discharge: HOME | End: 2023-04-25
Payer: COMMERCIAL

## 2023-04-25 PROCEDURE — 97110 THERAPEUTIC EXERCISES: CPT

## 2023-04-25 PROCEDURE — 97530 THERAPEUTIC ACTIVITIES: CPT

## 2023-04-25 NOTE — PLAN OF CARE
[address] [address] [place]  Phone: [phone] Fax: [phone]        Physical Therapy Re-Certification Plan of Care/MD UPDATE      Dear  Dr. Zhanna Rodgers,    We had the pleasure of treating the following patient for physical therapy services at [address] [place] [address]. A summary of our findings can be found in the updated assessment below. This includes our plan of care. If you have any questions or concerns regarding these findings, please do not hesitate to contact me at the office phone number checked above.   Thank you for the referral.     Physician Signature:________________________________Date:__________________  By signing above (or electronic signature), therapists plan is approved by physician    Date Range Of Visits: 2023  Total Visits to Date: 25  Overall Response to Treatment:   [x]Patient is responding well to treatment and improvement is noted with regards  to goals   []Patient should continue to improve in reasonable time if they continue HEP   []Patient has plateaued and is no longer responding to skilled PT intervention    []Patient is getting worse and would benefit from return to referring MD   []Patient unable to adhere to initial POC   []Other:           Physical Therapy Treatment Note/ Progress Report:       Date:  2023    Patient Name:  Karlie Hicks    :  1980  MRN: [id number]  Restrictions/Precautions:    Medical/Treatment Diagnosis Information:  Diagnosis: s/p R knee sx 2/3/2023  Treatment Diagnosis: M25.561, M25.661, W73.53  Insurance/Certification information:  PT Insurance Information: [facility]; 20 visits/year; no auth; no ded; OOP not met; $35 COPAY  Physician Information:  Lilliam Turk MD  Plan of care signed (Y/N):     Date of Patient follow up with Physician:      Progress Report: [x]  Yes  []  No     Date Range for reporting period:  Beginning:

## 2023-04-27 ENCOUNTER — HOSPITAL ENCOUNTER (OUTPATIENT)
Dept: PHYSICAL THERAPY | Age: 43
Setting detail: THERAPIES SERIES
Discharge: HOME | End: 2023-04-27
Payer: COMMERCIAL

## 2023-04-27 NOTE — FLOWSHEET NOTE
[facility]    Physical Therapy  Cancellation/No-show Note  Patient Name:  Audra Lopez  :  1980   Date:  2023  Cancelled visits to date: 1  No-shows to date: 0    For today's appointment patient:  [x]  Cancelled  []  Rescheduled appointment  []  No-show     Reason given by patient:  [x]  Patient ill  []  Conflicting appointment  []  No transportation    []  Conflict with work  []  No reason given  []  Other:     Comments:      Electronically signed by:  Be Brown PT, PT

## 2023-05-01 ENCOUNTER — HOSPITAL ENCOUNTER (OUTPATIENT)
Dept: PHYSICAL THERAPY | Age: 43
Setting detail: THERAPIES SERIES
Discharge: HOME | End: 2023-05-01
Payer: COMMERCIAL

## 2023-05-01 PROCEDURE — 97110 THERAPEUTIC EXERCISES: CPT

## 2023-05-01 PROCEDURE — 97530 THERAPEUTIC ACTIVITIES: CPT

## 2023-05-01 NOTE — FLOWSHEET NOTE
Minutes: 45   Total Treatment Minutes: 45      [] EVAL (LOW) 15242 (typically 20 minutes face-to-face)  [] EVAL (MOD) 16638 (typically 30 minutes face-to-face)  [] EVAL (HIGH) 91078 (typically 45 minutes face-to-face)  [] RE-EVAL     [x] AL(72372) x  2  [] DRY NEEDLE 1 OR 2 MUSCLES  [] NMR (64664) x      [] DRY NEEDLE 3+ MUSCLES  [] Manual (99615) x       [x] TA (22038) x 1    [] Mech Traction (69924)  [] ES(attended) (82572)     [] ES (un) (17549):   [] VASO (00342)  [] Other:      GOALS:  Patient stated goal: \"get back to working out, skiing, working\"  [] Progressing: [] Met: [x] Not Met: [] Adjusted    Therapist goals for Patient:   Short Term Goals: To be achieved in: 2 weeks  1. Independent in HEP and progression per patient tolerance, in order to prevent re-injury. [] Progressing: [x] Met: [] Not Met: [] Adjusted  2. Patient will have a decrease in pain to facilitate improvement in movement, function, and ADLs as indicated by Functional Deficits. [] Progressing: [x] Met: [] Not Met: [] Adjusted    Long Term Goals: To be achieved in: 12 weeks  1. Disability index score of 70 or higher for the LEFS to assist with reaching prior level of function. [x] Progressing: [] Met: [] Not Met: [] Adjusted  2. Patient will demonstrate increased AROM to full R knee flexion and extension to allow for proper joint functioning as indicated by patients Functional Deficits. [] Progressing: [x] Met: [] Not Met: [] Adjusted  3. Patient will demonstrate an increase in Strength to good proximal hip strength and control, within 5lb HHD in LE to allow for proper functional mobility as indicated by patients Functional Deficits. [x] Progressing: [] Met: [] Not Met: [] Adjusted  4. Patient will return to walking without increased symptoms or restrictions and without functional activities without increased symptoms or restriction. [] Progressing: [x] Met: [] Not Met: [] Adjusted  5.  Pt will be able to return to work without

## 2023-05-04 ENCOUNTER — HOSPITAL ENCOUNTER (OUTPATIENT)
Dept: PHYSICAL THERAPY | Age: 43
Setting detail: THERAPIES SERIES
Discharge: HOME | End: 2023-05-04
Payer: COMMERCIAL

## 2023-05-04 PROCEDURE — 97110 THERAPEUTIC EXERCISES: CPT

## 2023-05-04 PROCEDURE — 97530 THERAPEUTIC ACTIVITIES: CPT

## 2023-05-04 NOTE — FLOWSHEET NOTE
[address] [address] [address] [place]  Phone: [phone] Fax: [phone]      Physical Therapy Treatment Note/ Progress Report:       Date:  2023    Patient Name:  Katja Ortiz    :  1980  MRN: [id number]  Restrictions/Precautions:    Medical/Treatment Diagnosis Information:  Diagnosis: s/p R knee sx 2/3/2023  Treatment Diagnosis: M25.561, M25.661, B57.68  Insurance/Certification information:  PT Insurance Information: [facility]; 20 visits/year; no auth; no ded; OOP not met; $35 COPAY  Physician Information:  Jatin Alonso MD  Plan of care signed (Y/N):     Date of Patient follow up with Physician:      Progress Report: []  Yes  [x]  No     Date Range for reporting period:  Beginning: 3/13/2023  Endin visits or 30 days    Progress report due (10 Rx/or 30 days whichever is less): 9044    Recertification due (POC duration/ or 90 days whichever is less): 2023    Visit # Insurance Allowable Auth Needed   24 20 PER YEAR  *TOTAL OF 37 VISITS COVERED DUE TO INSURANCE CHANGE AS OF * []Yes    [x]No     Latex Allergy:  [x]NO      []YES  Preferred Language for Healthcare:   [x]English       []other:  Functional Scale: LEFS: 52/80   Date assessed: 2023    Pain level:  0/10     SUBJECTIVE:  Patient states knee feels good, had a little bit of soreness later in the day after running but has felt fine the last few days.      OBJECTIVE:   Observation:   Test measurements:      2023  ROM LEFT RIGHT   Knee ext 0 0   Knee Flex 140 142     Strength (lbs w/ HH dynamometer) LEFT RIGHT   HIP Abductors  19.4 23.1   Knee EXT (quad) 63.0 47.3   Knee Flex (HS) 32.9 32.9         RESTRICTIONS/PRECAUTIONS:  s/p R ACLr w/ quad tendon autograft, DOS: 2/3/2023    Exercises/Interventions:     Therapeutic Ex (29772)   Min: 30 Sets/sec Reps Notes/CUES   Retro Stepper/BIKE 5'  Full revolutions   Seated HS stretch 30'' 3 reviewed

## 2023-05-09 ENCOUNTER — HOSPITAL ENCOUNTER (OUTPATIENT)
Dept: PHYSICAL THERAPY | Age: 43
Setting detail: THERAPIES SERIES
Discharge: HOME | End: 2023-05-09
Payer: COMMERCIAL

## 2023-05-09 PROCEDURE — 97530 THERAPEUTIC ACTIVITIES: CPT

## 2023-05-09 PROCEDURE — 97110 THERAPEUTIC EXERCISES: CPT

## 2023-05-09 NOTE — FLOWSHEET NOTE
[facility] [address] [facility]  Phone: [phone] Fax: [phone]      Physical Therapy Treatment Note/ Progress Report:       Date:  2023    Patient Name:  Alicia De La Cruz    :  1980  MRN: [id number]  Restrictions/Precautions:    Medical/Treatment Diagnosis Information:  Diagnosis: s/p R knee sx 2/3/2023  Treatment Diagnosis: M25.561, M25.661, R45.08  Insurance/Certification information:  PT Insurance Information: [facility]; 20 visits/year; no auth; no ded; OOP not met; $35 COPAY  Physician Information:  Sergio Mccrary MD  Plan of care signed (Y/N):     Date of Patient follow up with Physician:      Progress Report: []  Yes  [x]  No     Date Range for reporting period:  Beginning: 3/13/2023  Endin visits or 30 days    Progress report due (10 Rx/or 30 days whichever is less):     Recertification due (POC duration/ or 90 days whichever is less): 2023    Visit # Insurance Allowable Auth Needed   25 20 PER YEAR  *TOTAL OF 37 VISITS COVERED DUE TO INSURANCE CHANGE AS OF * []Yes    [x]No     Latex Allergy:  [x]NO      []YES  Preferred Language for Healthcare:   [x]English       []other:  Functional Scale: LEFS: 52/80   Date assessed: 2023    Pain level:  0/10     SUBJECTIVE:  Patient states her knee was a little sore over the weekend but feels okay today. States she has also noticed some \"crackling\" in knee when straightening it but states it is not at all symptomatic.     OBJECTIVE:   Observation:   Test measurements:      2023  ROM LEFT RIGHT   Knee ext 0 0   Knee Flex 140 142     Strength (lbs w/ HH dynamometer) LEFT RIGHT   HIP Abductors  19.4 23.1   Knee EXT (quad) 63.0 47.3   Knee Flex (HS) 32.9 32.9         RESTRICTIONS/PRECAUTIONS:  s/p R ACLr w/ quad tendon autograft, DOS: 2/3/2023    Exercises/Interventions:     Therapeutic Ex (34350)   Min: 30 Sets/sec Reps Notes/CUES   Retro

## 2023-05-12 ENCOUNTER — APPOINTMENT (OUTPATIENT)
Dept: PHYSICAL THERAPY | Age: 43
End: 2023-05-12
Payer: COMMERCIAL

## 2023-05-16 ENCOUNTER — HOSPITAL ENCOUNTER (OUTPATIENT)
Dept: PHYSICAL THERAPY | Age: 43
Setting detail: THERAPIES SERIES
Discharge: HOME | End: 2023-05-16
Payer: COMMERCIAL

## 2023-05-16 PROCEDURE — 97110 THERAPEUTIC EXERCISES: CPT

## 2023-05-16 NOTE — FLOWSHEET NOTE
[facility] [address] [facility]  Phone: [phone] Fax: [phone]      Physical Therapy Treatment Note/ Progress Report:       Date:  2023    Patient Name:  Laura Cardenas    :  1980  MRN: [id number]  Restrictions/Precautions:    Medical/Treatment Diagnosis Information:  Diagnosis: s/p R knee sx 2/3/2023  Treatment Diagnosis: M25.561, M25.661, K07.64  Insurance/Certification information:  PT Insurance Information: [facility]; 20 visits/year; no auth; no ded; OOP not met; $35 COPAY  Physician Information:  Jesse Gibson MD  Plan of care signed (Y/N):     Date of Patient follow up with Physician:      Progress Report: []  Yes  [x]  No     Date Range for reporting period:  Beginning: 3/13/2023  Endin visits or 30 days    Progress report due (10 Rx/or 30 days whichever is less): 6013    Recertification due (POC duration/ or 90 days whichever is less): 2023    Visit # Insurance Allowable Auth Needed   26 20 PER YEAR  *TOTAL OF 37 VISITS COVERED DUE TO INSURANCE CHANGE AS OF * []Yes    [x]No     Latex Allergy:  [x]NO      []YES  Preferred Language for Healthcare:   [x]English       []other:  Functional Scale: LEFS: 52/80   Date assessed: 2023    Pain level:  0/10     SUBJECTIVE:  Patient states that she feels like the knee has been a little more swollen since walking more. \"I feel some popping at the back of the knee. It doesn't hurt, but I notice it. \"     OBJECTIVE:   Observation:   Test measurements:      2023  ROM LEFT RIGHT   Knee ext 0 0   Knee Flex 140 142     Strength (lbs w/ HH dynamometer) LEFT RIGHT   HIP Abductors  19.4 23.1   Knee EXT (quad) 63.0 47.3   Knee Flex (HS) 32.9 32.9         RESTRICTIONS/PRECAUTIONS:  s/p R ACLr w/ quad tendon autograft, DOS: 2/3/2023    Exercises/Interventions:     Therapeutic Ex (91743)   Min: 40 Sets/sec Reps Notes/CUES   Retro Stepper/BIKE 5'

## 2023-05-19 ENCOUNTER — HOSPITAL ENCOUNTER (OUTPATIENT)
Dept: PHYSICAL THERAPY | Age: 43
Setting detail: THERAPIES SERIES
Discharge: HOME | End: 2023-05-19
Payer: COMMERCIAL

## 2023-05-19 PROCEDURE — 97110 THERAPEUTIC EXERCISES: CPT

## 2023-05-19 NOTE — FLOWSHEET NOTE
[address] [address] [facility]  Phone: [phone] Fax: [phone]      Physical Therapy Treatment Note/ Progress Report:       Date:  2023    Patient Name:  Shahnaz Lira    :  1980  MRN: [id number]  Restrictions/Precautions:    Medical/Treatment Diagnosis Information:  Diagnosis: s/p R knee sx 2/3/2023  Treatment Diagnosis: M25.561, M25.661, O71.05  Insurance/Certification information:  PT Insurance Information: [facility]; 20 visits/year; no auth; no ded; OOP not met; $35 COPAY  Physician Information:  Magy Carpio MD  Plan of care signed (Y/N):     Date of Patient follow up with Physician:      Progress Report: []  Yes  [x]  No     Date Range for reporting period:  Beginning: 3/13/2023  Endin visits or 30 days    Progress report due (10 Rx/or 30 days whichever is less): 5/15/5744    Recertification due (POC duration/ or 90 days whichever is less): 2023    Visit # Insurance Allowable Auth Needed   27 20 PER YEAR  *TOTAL OF 37 VISITS COVERED DUE TO INSURANCE CHANGE AS OF * []Yes    [x]No     Latex Allergy:  [x]NO      []YES  Preferred Language for Healthcare:   [x]English       []other:  Functional Scale: LEFS: 52/80   Date assessed: 2023    Pain level:  0/10     SUBJECTIVE:  No complaints with the knee today. 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 Pt notes that her knee felt ok after last visit; she agrees that she had a proper amount of muscle soreness after the exercises.         OBJECTIVE:   Observation:   Test measurements:      2023  ROM LEFT RIGHT   Knee ext 0 0   Knee Flex 140 142     Strength (lbs w/ HH dynamometer) LEFT RIGHT   HIP Abductors  19.4 23.1   Knee EXT (quad) 63.0 47.3   Knee Flex (HS) 32.9 32.9         RESTRICTIONS/PRECAUTIONS:  s/p R

## 2023-05-26 ENCOUNTER — HOSPITAL ENCOUNTER (OUTPATIENT)
Dept: PHYSICAL THERAPY | Age: 43
Setting detail: THERAPIES SERIES
Discharge: HOME | End: 2023-05-26
Payer: COMMERCIAL

## 2023-05-26 PROCEDURE — 97110 THERAPEUTIC EXERCISES: CPT

## 2023-05-26 PROCEDURE — 97530 THERAPEUTIC ACTIVITIES: CPT

## 2023-05-26 PROCEDURE — 97112 NEUROMUSCULAR REEDUCATION: CPT

## 2023-05-26 NOTE — FLOWSHEET NOTE
[facility] [address] [facility]  Phone: [phone] Fax: [phone]      Physical Therapy Treatment Note/ Progress Report:       Date:  2023    Patient Name:  Elizabeth Ortiz    :  1980  MRN: [id number]  Restrictions/Precautions:    Medical/Treatment Diagnosis Information:  Diagnosis: s/p R knee sx 2/3/2023  Treatment Diagnosis: M25.561, M25.661, Z12.94  Insurance/Certification information:  PT Insurance Information: [facility]; 20 visits/year; no auth; no ded; OOP not met; $35 COPAY  Physician Information:  Brayan Aguilera MD  Plan of care signed (Y/N):     Date of Patient follow up with Physician:      Progress Report: []  Yes  [x]  No     Date Range for reporting period:  Beginning: 3/13/2023  Endin visits or 30 days    Progress report due (10 Rx/or 30 days whichever is less): 2023  Progress note nv. Recertification due (POC duration/ or 90 days whichever is less): 2023    Visit # Insurance Allowable Auth Needed   28 20 PER YEAR  *TOTAL OF 37 VISITS COVERED DUE TO INSURANCE CHANGE AS OF * []Yes    [x]No     Latex Allergy:  [x]NO      []YES  Preferred Language for Healthcare:   [x]English       []other:  Functional Scale: LEFS: 52/80   Date assessed: 2023    Pain level:  0/10     SUBJECTIVE:  Reports some mild muscle soreness after previous session. Definitely felt like previous session was a challenge.  Still feels like she struggles to get her knee all the way straight on her R side, especially with tasks like walking, going up/down stairs, etc.    OBJECTIVE:   Observation:   Test measurements:      2023  ROM LEFT RIGHT   Knee ext 0 0   Knee Flex 140 142     Strength (lbs w/ HH dynamometer) LEFT RIGHT   HIP Abductors  19.4 23.1   Knee EXT (quad) 63.0 47.3   Knee Flex (HS) 32.9 32.9         RESTRICTIONS/PRECAUTIONS:  s/p R ACLr w/ quad tendon autograft, DOS:

## 2023-05-30 ENCOUNTER — HOSPITAL ENCOUNTER (OUTPATIENT)
Dept: PHYSICAL THERAPY | Age: 43
Setting detail: THERAPIES SERIES
Discharge: HOME | End: 2023-05-30
Payer: COMMERCIAL

## 2023-05-30 PROCEDURE — 97112 NEUROMUSCULAR REEDUCATION: CPT

## 2023-05-30 PROCEDURE — 97110 THERAPEUTIC EXERCISES: CPT

## 2023-05-30 NOTE — PLAN OF CARE
[place] [address] [address] [place]  Phone: [phone] Fax: [phone]      Physical Therapy Re-Certification Plan of Care/MD UPDATE      Dear Dr. Hoda Hoff,    We had the pleasure of treating the following patient for physical therapy services at [address] [place] [address]. A summary of our findings can be found in the updated assessment below. This includes our plan of care. If you have any questions or concerns regarding these findings, please do not hesitate to contact me at the office phone number checked above. Thank you for the referral.     Physician Signature:________________________________Date:__________________  By signing above (or electronic signature), therapists plan is approved by physician    Date Range Of Visits: 4/25/23 to 5/30/23  Total Visits to Date: 34 visits  Overall Response to Treatment:   [x]Patient is responding well to treatment and improvement is noted with regards  to goals   []Patient should continue to improve in reasonable time if they continue HEP   []Patient has plateaued and is no longer responding to skilled PT intervention    []Patient is getting worse and would benefit from return to referring MD   []Patient unable to adhere to initial POC   [x]Other: Patient is approximately 20 weeks s/p R ACL-R on 2/3/2023. Patient's R knee A/PROM looks really good. I achieving 0-142 pretty comfortably. Does note some mild feelings of tightness just with end range R knee flexion. Patient still having difficulty, challenge with quad activation, endurance at terminal extension, but did report a very positive response to how her knee felt following significant focus on this at previous session. Cont focus on addressing these quad act, endurance deficits during today's session as well. Already demonstrating improved quad and glut endurance with positions of terminal knee extension.  Overall,

## 2023-06-02 ENCOUNTER — HOSPITAL ENCOUNTER (OUTPATIENT)
Dept: PHYSICAL THERAPY | Age: 43
Setting detail: THERAPIES SERIES
Discharge: HOME | End: 2023-06-02
Payer: COMMERCIAL

## 2023-06-02 PROCEDURE — 97112 NEUROMUSCULAR REEDUCATION: CPT

## 2023-06-02 PROCEDURE — 97530 THERAPEUTIC ACTIVITIES: CPT

## 2023-06-02 PROCEDURE — 97110 THERAPEUTIC EXERCISES: CPT

## 2023-06-02 NOTE — FLOWSHEET NOTE
co-morbidities. [] Plan just implemented, too soon to assess goals progression <30days   [] Goals require adjustment due to lack of progress  [] Patient is not progressing as expected and requires additional follow up with physician  [] Other    Prognosis for POC: [x] Good [] Fair  [] Poor    Patient requires continued skilled intervention: [x] Yes  [] No        PLAN: 2x/week for functional strength and balance  [x] Continue per plan of care [] Alter current plan (see comments)  [] Plan of care initiated [] Hold pending MD visit [] Discharge    Electronically signed by: Chidi Rogel, PT, DPT, MS, SCS     Note: If patient does not return for scheduled/recommended follow up visits, this note will serve as a discharge from care along with the most recent update on progress.

## 2023-06-07 ENCOUNTER — APPOINTMENT (OUTPATIENT)
Dept: PHYSICAL THERAPY | Age: 43
End: 2023-06-07
Payer: COMMERCIAL

## 2023-06-08 ENCOUNTER — HOSPITAL ENCOUNTER (OUTPATIENT)
Dept: PHYSICAL THERAPY | Age: 43
Setting detail: THERAPIES SERIES
Discharge: HOME | End: 2023-06-08
Payer: COMMERCIAL

## 2023-06-08 NOTE — FLOWSHEET NOTE
[facility]    Physical Therapy  Cancellation/No-show Note  Patient Name:  Liz Sullivan  :  1980   Date:  2023  Cancelled visits to date: 1  No-shows to date: 0    For today's appointment patient:  [x]  Cancelled  []  Rescheduled appointment  []  No-show     Reason given by patient:  []  Patient ill  []  Conflicting appointment  []  No transportation    []  Conflict with work  []  No reason given  [x]  Other:     Comments:  Something came up.     Electronically signed by:  Lidya Reyes, PT, DPT, MS, SCS

## 2023-06-09 ENCOUNTER — APPOINTMENT (OUTPATIENT)
Dept: PHYSICAL THERAPY | Age: 43
End: 2023-06-09
Payer: COMMERCIAL

## 2023-06-20 ENCOUNTER — HOSPITAL ENCOUNTER (OUTPATIENT)
Dept: PHYSICAL THERAPY | Age: 43
Setting detail: THERAPIES SERIES
Discharge: HOME | End: 2023-06-20
Payer: COMMERCIAL

## 2023-06-20 NOTE — FLOWSHEET NOTE
[facility]    Physical Therapy  Cancellation/No-show Note  Patient Name:  Bruna Chávez  :  1980   Date:  2023  Cancelled visits to date: 2  No-shows to date: 0    For today's appointment patient:  [x]  Cancelled  []  Rescheduled appointment  []  No-show     Reason given by patient:  []  Patient ill  []  Conflicting appointment  []  No transportation    []  Conflict with work  []  No reason given  [x]  Other:     Comments:  Just can't make it work with her schedule today.     Electronically signed by:  Lena Early, PT, DPT, MS, SCS

## 2023-06-22 ENCOUNTER — HOSPITAL ENCOUNTER (OUTPATIENT)
Dept: PHYSICAL THERAPY | Age: 43
Setting detail: THERAPIES SERIES
Discharge: HOME | End: 2023-06-22
Payer: COMMERCIAL

## 2023-06-22 PROCEDURE — 97110 THERAPEUTIC EXERCISES: CPT

## 2023-06-22 NOTE — FLOWSHEET NOTE
[place] [address] [address] [place]  Phone: [phone] Fax: [phone]      Physical Therapy Treatment Note/ Progress Report:       Date:  2023    Patient Name:  Maite Montenegro    :  1980  MRN: [id number]  Restrictions/Precautions:    Medical/Treatment Diagnosis Information:  Diagnosis: s/p R knee sx 2/3/2023  Treatment Diagnosis: M25.561, M25.661, X55.39  Insurance/Certification information:  PT Insurance Information: [facility]; 20 visits/year; no auth; no ded; OOP not met; $35 COPAY  Physician Information:  Loyal Lundborg, MD  Plan of care signed (Y/N):     Date of Patient follow up with Physician:      Progress Report: []  Yes  [x]  No     Date Range for reporting period:  Beginning: 3/13/2023  Endin visits or 30 days    Progress report due (10 Rx/or 30 days whichever is less):     Recertification due (POC duration/ or 90 days whichever is less): 23    Visit # Insurance Allowable Auth Needed   32 20 PER YEAR  *TOTAL OF 37 VISITS COVERED DUE TO INSURANCE CHANGE AS OF * []Yes    [x]No     Latex Allergy:  [x]NO      []YES  Preferred Language for Healthcare:   [x]English       []other:  Functional Scale: LEFS: 15% disability - LEFS (68/80)   Date assessed: 2023    Pain level:  0/10     SUBJECTIVE:  Reports that her knee has been doing well. She will often not feel her knee and forget about it, but will tweak it every once in a while.      OBJECTIVE:   Observation:   Test measurements:      2023  ROM LEFT RIGHT   Knee ext 0 0   Knee Flex 140 147     Strength (lbs w/ HH dynamometer) LEFT RIGHT   HIP Abductors 26.2 22.6   Knee EXT (quad) 63.0 50.1   Knee Flex (HS) 41.7 44.4         RESTRICTIONS/PRECAUTIONS:  s/p R ACLr w/ quad tendon autograft, DOS: 2/3/2023    Exercises/Interventions:     Therapeutic Ex (63911)  Sets/sec Reps Notes/CUES   Retro Stepper/BIKE 5'  Full revolutions   Seated HS

## 2023-06-30 ENCOUNTER — HOSPITAL ENCOUNTER (OUTPATIENT)
Dept: PHYSICAL THERAPY | Age: 43
Setting detail: THERAPIES SERIES
Discharge: HOME | End: 2023-06-30
Payer: COMMERCIAL

## 2023-06-30 PROCEDURE — 97110 THERAPEUTIC EXERCISES: CPT

## 2023-06-30 PROCEDURE — 97750 PHYSICAL PERFORMANCE TEST: CPT

## 2023-07-03 ENCOUNTER — HOSPITAL ENCOUNTER (OUTPATIENT)
Dept: PHYSICAL THERAPY | Age: 43
Setting detail: THERAPIES SERIES
Discharge: HOME | End: 2023-07-03
Payer: COMMERCIAL

## 2023-07-03 PROCEDURE — 97530 THERAPEUTIC ACTIVITIES: CPT

## 2023-07-03 PROCEDURE — 97110 THERAPEUTIC EXERCISES: CPT

## 2023-07-03 PROCEDURE — 97112 NEUROMUSCULAR REEDUCATION: CPT

## 2023-07-03 NOTE — PLAN OF CARE
Deficit   Isokinetic testing (60) 83 38 54   Isokinetic testing (180) 42 29 31   Peak torque /BW (60) 69 32    Peak torque /BW (180) 35 24      Notes/observations regarding functional testing: Today was Pt's first isokinetic test.  No complaints of knee pain with testing. Results were reviewed. Therapeutic Ex (21367) x 18 Sets/sec Reps Notes/CUES   Retro Stepper/BIKE 5'  Full revolutions   Seated HS stretch 30'' 3 reviewed   Prone quad stretch 30'' 3    Strap calf stretch   HEP   BFR 8'  SLR, SL ABD, LAQ, HR, mini squat, step up 8in  80%; green cuff   3-way lunge 1 5 Fwd,lat,curtsy   Standing reclined SLR   Max A from PT   Continuous step up/down 6in box, fwd and retro alternating   Single leg squat with KB touch to box Red kb, 12in box +1 foam   Hamstring sliders - SL down, DL up DL   Cybex HS curl - double leg 3 8-10 45#   Single leg squat to chair for time 2 20sec Each side   SL kb pick-ups for hamstring 2 5 6in box, level ground 2 green kb's   Fwd slide lunge with holds 3sec 10 No wt, range per tim   Cybex leg ext - 45-0 3 8-10 30#   Cybex leg ext iso @ 30 deg 10'' 10    BOSU fwd/side lunge w/ ST 10'' 10 4in box under L foot   Hip Hinges to wall 1 12 2 green Kbs (10#)   SL KB kickstands  2 12 X2 15# (blue) KBs   Lateral heel tap 2 10-12 6in; cues for level pelvic height   Leg press SL 4 8 90#, 5 reps in reserve  100# on the L   TKE - ball into wall 10'' 10 Yellow ball   Glute side step matrix 5 2ea movement. Bilateral Side step, hip abd, hip ER   Lateral TB walks/monster walks 2 30 feet Red/gray AK   Mini squat   At half wall, 40 deg   Slide Lunge 2 15 Posterior and lateral   Slide HS eccentrics      Step ups/ecc step down w/SC   TUT:  12\"   Reverse lunge      Quad hip ext/wall-ball rolls      Walking lunge 2 15' 45 deg depth.  2x green KB   Standing static lunge isos at depth - front heel declined for quad bias 5s 10 bilat   Kyr[place]z split squats on 12\" 2 15 bilat   Fwd/bwd step downs on 6\" - quad on

## 2023-07-12 ENCOUNTER — HOSPITAL ENCOUNTER (OUTPATIENT)
Dept: PHYSICAL THERAPY | Age: 43
Setting detail: THERAPIES SERIES
Discharge: HOME | End: 2023-07-12
Payer: COMMERCIAL

## 2023-07-12 PROCEDURE — 97530 THERAPEUTIC ACTIVITIES: CPT

## 2023-07-12 PROCEDURE — 97110 THERAPEUTIC EXERCISES: CPT

## 2023-07-12 NOTE — FLOWSHEET NOTE
[address], [address] [place] [address]  Phone: [phone] Fax: [phone]      Physical Therapy Treatment Note/ Progress Report:       Date:  2023    Patient Name:  Jimena Ryan    :  1980  MRN: [id number]  Restrictions/Precautions:    Medical/Treatment Diagnosis Information:  Diagnosis: s/p R knee sx 2/3/2023  Treatment Diagnosis: M25.561, M25.661, K60.06  Insurance/Certification information:  PT Insurance Information: [facility]; 20 visits/year; no auth; no ded; OOP not met; $35 COPAY  Physician Information:  Fred Salazar MD  Plan of care signed (Y/N):     Date of Patient follow up with Physician:      Progress Report: []  Yes  [x]  No     Date Range for reporting period:  Beginning: 3/13/2023  Endin visits or 30 days    Progress report due (10 Rx/or 30 days whichever is less): 6713    Recertification due (POC duration/ or 90 days whichever is less): 8/3/2023    Visit # Insurance Allowable Auth Needed   35 20 PER YEAR  *TOTAL OF 37 VISITS COVERED DUE TO INSURANCE CHANGE AS OF * []Yes    [x]No     Latex Allergy:  [x]NO      []YES  Preferred Language for Healthcare:   [x]English       []other:  Functional Scale: LEFS: 6.25% disability - LEFS (75/80)   Date assessed: 7/3/2023    Pain level:  0/10     SUBJECTIVE:  No complaints of knee pain. Pt states she has not been lifting as much on her own as she wants or should, states they are about to go on vacation for a week and plans to join a gym when she returns.      OBJECTIVE:   Observation:   Test measurements:      2023  ROM LEFT RIGHT   Knee ext 0 0   Knee Flex 140 147     Strength (lbs w/ HH dynamometer) LEFT RIGHT   HIP Abductors 26.2 22.6   Knee EXT (quad) 63.0 50.1   Knee Flex (HS) 41.7 44.4     Functional testing criteria: 2023    Therapeutic Ex (11484) Sets/sec Reps/time Notes/Cues     Retro Stepper/BIKE- warm up 3 min retro     LE CKC warm

## 2023-07-26 ENCOUNTER — HOSPITAL ENCOUNTER (OUTPATIENT)
Dept: PHYSICAL THERAPY | Age: 43
Setting detail: THERAPIES SERIES
Discharge: HOME | End: 2023-07-26
Payer: COMMERCIAL

## 2023-07-26 NOTE — FLOWSHEET NOTE
[address] [place], [place] Office    Physical Therapy  Cancellation/No-show Note  Patient Name:  Nataly Hyatt  :  1980   Date:  2023  Cancelled visits to date: 2  No-shows to date: 1    For today's appointment patient:  []  Cancelled  []  Rescheduled appointment  []  No-show     Reason given by patient:  []  Patient ill  []  Conflicting appointment  []  No transportation    []  Conflict with work  [x]  No reason given  []  Other:     Comments:     Electronically signed by:  Chris Carrington PT, DP

## 2023-08-01 ENCOUNTER — HOSPITAL ENCOUNTER (OUTPATIENT)
Dept: PHYSICAL THERAPY | Age: 43
Setting detail: THERAPIES SERIES
Discharge: HOME | End: 2023-08-01
Payer: COMMERCIAL

## 2023-08-01 PROCEDURE — 97530 THERAPEUTIC ACTIVITIES: CPT

## 2023-08-01 PROCEDURE — 97110 THERAPEUTIC EXERCISES: CPT

## 2023-08-01 NOTE — FLOWSHEET NOTE
38 54   Isokinetic testing (180) 42 29 31   Peak torque /BW (60) 69 32    Peak torque /BW (180) 35 24      Notes/observations regarding functional testing: Today was Pt's first isokinetic test.  No complaints of knee pain with testing. Results were reviewed. Therapeutic Ex (11369) x 25 Sets/sec Reps Notes/CUES   Retro Stepper/BIKE 5'  Full revolutions   Seated HS stretch 30'' 3 reviewed   Prone quad stretch 30'' 3    Wall sit - 45 deg 20'' 5    Leg ext iso - 30 deg, 60 deg 10'' 6    Side steps 2 15' Blue AK   Spring board squat 3 4-6 5550 tempo                           Manual Intervention (42340)      Knee PROM 0    End range Flexion/extension to neutral   Tib/Fem Mobs 0  Posteromedial corner; end range flex   STM VL 0     Patella Mobs 0  gentle   Ankle mobs      STM 0  Quad tendon; distal IT band         NMR re-education (86680)   CUES NEEDED   VMS burst 10/10   Quad set into SLR   10:10 biofeedback quad set  min  Long-sitting, 3 min with towel under knee, 3 min with towel under heel   Single leg stance/Balance/Prop 1 30 Tramp toss   SC SL balance - declined - ball toss 3 To fatigue    SL KB circles - 7.5# (red) KB 2 6 Cw/ccw; bilat, airex          Therapeutic Activity (35690) -20      Wall jumps      Line jump - fwd      Line jump - lat      Squat jump 2 5    Ladder      Broad jump 4 5    Skater jump  10    Tall step up 20'' 3 12''     Therapeutic Exercise and NMR EXR  [x] (73182) Provided verbal/tactile cueing for activities related to strengthening, flexibility, endurance, ROM for improvements in LE, proximal hip, and core control with self care, mobility, lifting, ambulation. [x] (08433) Provided verbal/tactile cueing for activities related to improving balance, coordination, kinesthetic sense, posture, motor skill, proprioception  to assist with LE, proximal hip, and core control in self care, mobility, lifting, ambulation and eccentric single leg control.      NMR and Therapeutic Activities:    [x]

## 2023-08-15 ENCOUNTER — APPOINTMENT (OUTPATIENT)
Dept: PHYSICAL THERAPY | Age: 43
End: 2023-08-15
Payer: COMMERCIAL

## 2023-08-17 ENCOUNTER — HOSPITAL ENCOUNTER (OUTPATIENT)
Dept: PHYSICAL THERAPY | Age: 43
Setting detail: THERAPIES SERIES
Discharge: HOME | End: 2023-08-17
Payer: COMMERCIAL

## 2023-08-17 PROCEDURE — 9990000027 HC GAP GROUP

## 2023-08-17 NOTE — FLOWSHEET NOTE
[address], [address] [place] [address]  Phone: [phone] Fax: [phone]    GAP Program-Treatment Note     Date:  2023    Patient Name:  Alivia Baum    :  1980  MRN: [id number]    Medical/Treatment Diagnosis Information:  Diagnosis: s/p R knee sx 2/3/2023  Treatment Diagnosis: M25.561, M25.661, L29.03  Insurance/Certification information:  PT Insurance Information: [facility]; 20 visits/year; no auth; no ded; OOP not met  Physician Information:   Dr. Milind Chávez    Date of Patient follow up with Physician:     Latex Allergy:  [x]NO      []YES    RESTRICTIONS/PRECAUTIONS: s/p R ACLr 2/3/2023    GAP Visit #   1     Pain level:  0/10     SUBJECTIVE:  Pt states knee is feeling good today. Anterior knee pain has not bothered her in a few weeks. OBJECTIVE:       Exercises/Interventions:     EXERCISE Sets/sec Reps Notes   Bike 5 min     Side steps 2 15' Red AK   Springboard lunge 5'' 10    Line jumps - fwd 20'' 2    Line jumps - lat 20'' 2    Squat jumps  10    Broad jumps  10    Ecc SS - power drive 3 6    Fwd heel tap w/ heel elevated  3 8 6 in   Seated HS curl 3 6-8 45#/45#/50#                                           Charges:  [] GAP EVAL  [x] GAP GROUP          ASSESSMENT:  Excellent tolerance to treatment. Pt is quad dominant, challenged with posterior chain recruitment with jumps. No anterior knee pain today. Pt to continue to benefit from skilled PT to improve single leg strength and endurance, agility, and full return to sport.     Return to Play: (if applicable)   []  Stage 1: Intro to Strength   []  Stage 2: Return to Run and Strength   [x]  Stage 3: Return to Jump and Strength   []  Stage 4: Dynamic Strength and Agility   []  Stage 5: Sport Specific Training     []  Ready to Return to Play, Meets All Above Stages   []  Not Ready for Return to Sports   Comments:                         Treatment/Activity

## 2023-08-31 ENCOUNTER — HOSPITAL ENCOUNTER (OUTPATIENT)
Dept: PHYSICAL THERAPY | Age: 43
Setting detail: THERAPIES SERIES
Discharge: HOME | End: 2023-08-31
Payer: COMMERCIAL

## 2023-08-31 PROCEDURE — 9990000027 HC GAP GROUP

## 2023-08-31 NOTE — FLOWSHEET NOTE
Training     []  Ready to Return to Play, Meets All Above Stages   []  Not Ready for Return to Sports   Comments:                         Treatment/Activity Tolerance:  [x] Patient tolerated treatment well [] Patient limited by fatique  [] Patient limited by pain  [] Patient limited by other medical complications  [] Other:         PLAN: 1x every other week x3 months for jump, plyo, agility progression  [x] Continue per plan of care [] Alter current plan (see comments)  [] Plan of care initiated [] Hold pending MD visit [] Discharge    Electronically signed by: Jude Cockayne, PT

## 2023-09-13 ENCOUNTER — HOSPITAL ENCOUNTER (OUTPATIENT)
Dept: PHYSICAL THERAPY | Age: 43
Setting detail: THERAPIES SERIES
Discharge: HOME | End: 2023-09-13
Payer: COMMERCIAL

## 2023-09-13 PROCEDURE — 9990000027 HC GAP GROUP

## 2023-09-13 NOTE — FLOWSHEET NOTE
[address], [address] [place] [address]  Phone: [phone] Fax: [phone]    GAP Program-Treatment Note     Date:  2023    Patient Name:  Lorena Hemphill    :  1980  MRN: [id number]    Medical/Treatment Diagnosis Information:  Diagnosis: s/p R knee sx 2/3/2023  Treatment Diagnosis: M25.561, M25.661, T87.81  Insurance/Certification information:  PT Insurance Information: [facility]; 20 visits/year; no auth; no ded; OOP not met  Physician Information:   Dr. Cassandra Norton    Date of Patient follow up with Physician:     Latex Allergy:  [x]NO      []YES    RESTRICTIONS/PRECAUTIONS: s/p R ACLr 2/3/2023    GAP Visit #   3     Pain level:  0/10     SUBJECTIVE:  Pt states the front of her knee is bothering her since starting working out in the gym. States it is sore below the knee cap on the inside of her leg, especially when she bends it all the way. OBJECTIVE:       Exercises/Interventions:     EXERCISE Sets/sec Reps Notes   Bike 5 min     Side steps   Red AK   Springboard lunge      SL Line jumps - fwd      SL Line jumps - lat      Squat jumps      Broad jumps      Ecc SS - power drive      Fwd heel tap w/ heel elevated    6 in   Lateral heel tap 3 6-8 6 in   Goblet squat   Blue KB; heel elevated   Split stance hip thrust      Glute side step 2 15' Red AK   Leg press ecc 3 12 95#   Wall sit - shy of painful angle 20'' 5    Seated SLR+ 10'' 10    Isometric leg extension - 45 deg 10'' 10        Charges:  [] GAP EVAL  [x] GAP GROUP          ASSESSMENT:  Excellent tolerance to treatment. Adjusted loading to isometrics and eccentric focus to maintain strength and assist in decreasing symptoms consistent with patellar tendinitis. Pt to continue to benefit from skilled PT to improve single leg strength and endurance, agility, and full return to sport.     Return to Play: (if applicable)   []  Stage 1: Intro to Strength   []  Stage 2:

## 2023-09-26 ENCOUNTER — HOSPITAL ENCOUNTER (OUTPATIENT)
Dept: PHYSICAL THERAPY | Age: 43
Setting detail: THERAPIES SERIES
Discharge: HOME | End: 2023-09-26
Payer: COMMERCIAL

## 2023-09-26 PROCEDURE — 9990000027 HC GAP GROUP

## 2023-09-26 NOTE — FLOWSHEET NOTE
[address], [address] [place] [address]  Phone: [phone] Fax: [phone]    GAP Program-Treatment Note     Date:  2023    Patient Name:  Lena Cardenas    :  1980  MRN: [id number]    Medical/Treatment Diagnosis Information:  Diagnosis: s/p R knee sx 2/3/2023  Treatment Diagnosis: M25.561, M25.661, O43.16  Insurance/Certification information:  PT Insurance Information: [facility]; 20 visits/year; no auth; no ded; OOP not met  Physician Information:   Dr. Oscar Gray    Date of Patient follow up with Physician:     Latex Allergy:  [x]NO      []YES    RESTRICTIONS/PRECAUTIONS: s/p R ACLr 2/3/2023    GAP Visit #   4     Pain level:  0/10     SUBJECTIVE:  Pt states her knee is feeling much better since last session, only mild pain in front with certain exercises. OBJECTIVE:       Exercises/Interventions:     EXERCISE Sets/sec Reps Notes   Bike 5 min     Side steps   Red AK   Springboard lunge      SL Line jumps - fwd      SL Line jumps - lat      Squat jumps      Broad jumps      Ecc SS - power drive      Fwd heel tap w/ heel elevated    6 in   Lateral heel tap 3 6-8 6 in   Goblet squat   Blue KB; heel elevated   RDL 3 8 Blue KBs   Glute side step 2 15' Blue  AK   Leg press ecc 3 12 95#   Wall sit - shy of painful angle 20'' 5    Seated SLR+ 10'' 10    Ecc  leg extension  3 8-12 35#   Sled push/pull 2 30'              Charges:  [] GAP EVAL  [x] GAP GROUP          ASSESSMENT:  Excellent tolerance to treatment. Continued to load eccentric and isometrically for quad to prevent further tendon exacerbation. Pt to continue to benefit from skilled PT to improve single leg strength and endurance, agility, and full return to sport.     Return to Play: (if applicable)   []  Stage 1: Intro to Strength   []  Stage 2: Return to Run and Strength   [x]  Stage 3: Return to Jump and Strength   []  Stage 4: Dynamic Strength and Agility   []

## 2023-10-12 ENCOUNTER — HOSPITAL ENCOUNTER (OUTPATIENT)
Dept: PHYSICAL THERAPY | Age: 43
Setting detail: THERAPIES SERIES
Discharge: HOME | End: 2023-10-12
Payer: COMMERCIAL

## 2023-10-12 PROCEDURE — 9990000027 HC GAP GROUP

## 2023-10-12 NOTE — FLOWSHEET NOTE
[address], [address] [place] [address]  Phone: [phone] Fax: [phone]    GAP Program-Treatment Note     Date:  10/12/2023    Patient Name:  Blanche Bryant    :  1980  MRN: [id number]    Medical/Treatment Diagnosis Information:  Diagnosis: s/p R knee sx 2/3/2023  Treatment Diagnosis: M25.561, M25.661, Q60.33  Insurance/Certification information:  PT Insurance Information: [facility]; 20 visits/year; no auth; no ded; OOP not met  Physician Information:   Dr. Hermelinda Sandoval    Date of Patient follow up with Physician:     Latex Allergy:  [x]NO      []YES    RESTRICTIONS/PRECAUTIONS: s/p R ACLr 2/3/2023    GAP Visit #   5     Pain level:  0/10     SUBJECTIVE:  Pt states she fell on her leg playing volleyball but just made her quad sore. Pain through the front of the knee is gone. OBJECTIVE:       Exercises/Interventions:     EXERCISE Sets/sec Reps Notes   Bike 5 min     Side steps   Red AK   Springboard lunge 10 sec 10    Banded vertical jump 3 10    Lateral bounds 2 15'    Skater jumps 2 15'    Broad jumps over felice 4 5    BlazePod lateral felice react 30 sec 3    Fwd heel tap w/ heel elevated  3 8-10 6 in   Lateral heel tap   6 in   Goblet squat   Blue KB; heel elevated   RDL   Blue KBs   Glute side step 2 15' Blue  AK   Leg press ecc   95#   Wall sit - shy of painful angle      Seated SLR+      Ecc  leg extension    35#   Sled push/pull                Charges:  [] GAP EVAL  [x] GAP GROUP          ASSESSMENT:  Excellent tolerance to treatment. Able to progress back into plyometrics without pain. Pt to continue to benefit from skilled PT to improve single leg strength and endurance, agility, and full return to sport.     Return to Play: (if applicable)   []  Stage 1: Intro to Strength   []  Stage 2: Return to Run and Strength   [x]  Stage 3: Return to Jump and Strength   []  Stage 4: Dynamic Strength and Agility   []  Stage 5:

## 2023-10-26 ENCOUNTER — HOSPITAL ENCOUNTER (OUTPATIENT)
Dept: PHYSICAL THERAPY | Age: 43
Discharge: HOME | End: 2023-10-26

## 2023-10-26 NOTE — FLOWSHEET NOTE
[address] [place], [place] Office    Physical Therapy  Cancellation/No-show Note  Patient Name:  Alivia Baum  :  1980   Date:  10/26/2023  Cancelled visits to date: 3  No-shows to date: 1    For today's appointment patient:  [x]  Cancelled  []  Rescheduled appointment  []  No-show     Reason given by patient:  [x]  Patient ill  []  Conflicting appointment  []  No transportation    []  Conflict with work  []  No reason given  []  Other:     Comments:     Electronically signed by:  Yadi Nugent, PT, DP

## 2023-11-02 ENCOUNTER — HOSPITAL ENCOUNTER (OUTPATIENT)
Dept: PHYSICAL THERAPY | Age: 43
Setting detail: THERAPIES SERIES
Discharge: HOME | End: 2023-11-02
Payer: COMMERCIAL

## 2023-11-02 PROCEDURE — 9990000027 HC GAP GROUP

## 2023-11-02 NOTE — FLOWSHEET NOTE
[address], [address] [place] [address]  Phone: [phone] Fax: [phone]    GAP Program-Treatment Note     Date:  2023    Patient Name:  Natalie Del Castillo    :  1980  MRN: [id number]    Medical/Treatment Diagnosis Information:  Diagnosis: s/p R knee sx 2/3/2023  Treatment Diagnosis: M25.561, M25.661, F82.78  Insurance/Certification information:  PT Insurance Information: [facility]; 20 visits/year; no auth; no ded; OOP not met  Physician Information:   Dr. Pk Jackman    Date of Patient follow up with Physician:     Latex Allergy:  [x]NO      []YES    RESTRICTIONS/PRECAUTIONS: s/p R ACLr 2/3/2023    GAP Visit #   6     Pain level:  0/10     SUBJECTIVE:  Pt states her knee is doing well, mild anterior knee pain after she runs but nothing that lasts more than 24 hours. OBJECTIVE:       Exercises/Interventions:     EXERCISE Sets/sec Reps Notes   Bike 5 min     Side steps   Red AK   Springboard lunge 10 sec 10    Banded vertical jump 3 10    Lateral bounds 2 15'    Skater jumps 2 15'    Broad jumps over felice 4 5    BlazePod lateral fleice react 30 sec 3    Fwd heel tap w/ heel elevated  3 8-10 6 in   Lateral heel tap   6 in   Goblet squat   Blue KB; heel elevated   RDL   Blue KBs   Glute side step 2 15' Blue  AK   Leg press ecc   95#   Wall sit - shy of painful angle 20 sec 5    Seated SLR+      Ecc  leg extension    35#   Spring board leg ext 3 8 Black springs   HS alt leg iso 3 12-15 30#       Charges:  [] GAP EVAL  [x] GAP GROUP          ASSESSMENT:  Excellent tolerance to treatment. Continue through plyo training. Pt to continue to benefit from skilled PT to improve single leg strength and endurance, agility, and full return to sport.     Return to Play: (if applicable)   []  Stage 1: Intro to Strength   []  Stage 2: Return to Run and Strength   [x]  Stage 3: Return to Jump and Strength   []  Stage 4: Dynamic Strength and

## 2023-11-15 ENCOUNTER — HOSPITAL ENCOUNTER (OUTPATIENT)
Dept: PHYSICAL THERAPY | Age: 43
Setting detail: THERAPIES SERIES
Discharge: HOME | End: 2023-11-15
Payer: COMMERCIAL

## 2023-11-15 PROCEDURE — 9990000027 HC GAP GROUP

## 2023-11-15 NOTE — FLOWSHEET NOTE
Strength   []  Stage 4: Dynamic Strength and Agility   []  Stage 5: Sport Specific Training     []  Ready to Return to Play, Meets All Above Stages   []  Not Ready for Return to Sports   Comments:                         Treatment/Activity Tolerance:  [x] Patient tolerated treatment well [] Patient limited by fatique  [] Patient limited by pain  [] Patient limited by other medical complications  [] Other:         PLAN: 1x every other week x3 months for jump, plyo, agility progression  [x] Continue per plan of care [] Alter current plan (see comments)  [] Plan of care initiated [] Hold pending MD visit [] Discharge    Electronically signed by: Velma Smart PT

## 2023-11-27 ENCOUNTER — HOSPITAL ENCOUNTER (OUTPATIENT)
Dept: PHYSICAL THERAPY | Age: 43
Setting detail: THERAPIES SERIES
Discharge: HOME | End: 2023-11-27
Payer: COMMERCIAL

## 2023-11-27 PROCEDURE — 9990000027 HC GAP GROUP

## 2023-11-27 NOTE — FLOWSHEET NOTE
[address], [address] [place] [address]  Phone: [phone] Fax: [phone]    GAP Program-Treatment Note     Date:  2023    Patient Name:  Heike Stevens    :  1980  MRN: [id number]    Medical/Treatment Diagnosis Information:  Diagnosis: s/p R knee sx 2/3/2023  Treatment Diagnosis: M25.561, M25.661, O06.13  Insurance/Certification information:  PT Insurance Information: [facility]; 20 visits/year; no auth; no ded; OOP not met  Physician Information:   Dr. Salima Chatterjee    Date of Patient follow up with Physician:     Latex Allergy:  [x]NO      []YES    RESTRICTIONS/PRECAUTIONS: s/p R ACLr 2/3/2023    GAP Visit #   6     Pain level:  0/10     SUBJECTIVE:  Pt states her knee is doing well, no issues since last session. OBJECTIVE:       Exercises/Interventions:     EXERCISE Sets/sec Reps Notes   Bike 5 min     Side steps   Red AK   Springboard lunge 10 sec 10    Banded vertical jump 3 10    Lateral bounds 2 15'    Skater jumps 2 15'    Broad jumps over felice 4 5    BlazePod lateral felice react 30 sec 3    Fwd heel tap w/ heel elevated  3 8-10 6 in   Lateral heel tap   6 in   Goblet squat   Blue KB; heel elevated   RDL   Blue KBs   Glute side step 2 15' Blue  AK   Leg press ecc   95#   Wall sit - staggered stance/SL 20 sec 5    Seated SLR+      Ecc  leg extension    35#   Spring board leg ext 3 8 Black springs   RFESS pulses 3 fatigue Staying between 70-20 deg knee flexion for increased TUT   HS alt leg iso   30#       Charges:  [] GAP EVAL  [x] GAP GROUP          ASSESSMENT:  Excellent tolerance to treatment. Continue through plyo training. Pt to continue to benefit from skilled PT to improve single leg strength and endurance, agility, and full return to sport.     Return to Play: (if applicable)   []  Stage 1: Intro to Strength   []  Stage 2: Return to Run and Strength   [x]  Stage 3: Return to Jump and Strength   []  Stage

## 2023-12-11 ENCOUNTER — HOSPITAL ENCOUNTER (OUTPATIENT)
Dept: PHYSICAL THERAPY | Age: 43
Setting detail: THERAPIES SERIES
Discharge: HOME | End: 2023-12-11
Payer: COMMERCIAL

## 2023-12-11 PROCEDURE — 9990000027 HC GAP GROUP

## 2023-12-11 NOTE — FLOWSHEET NOTE
[address], [address] [place] [address]  Phone: [phone] Fax: [phone]    GAP Program-Treatment Note     Date:  2023    Patient Name:  Mariah García    :  1980  MRN: [id number]    Medical/Treatment Diagnosis Information:  Diagnosis: s/p R knee sx 2/3/2023  Treatment Diagnosis: M25.561, M25.661, J72.34  Insurance/Certification information:  PT Insurance Information: [facility]; 20 visits/year; no auth; no ded; OOP not met  Physician Information:   Dr. Jc Gonzalez    Date of Patient follow up with Physician:     Latex Allergy:  [x]NO      []YES    RESTRICTIONS/PRECAUTIONS: s/p R ACLr 2/3/2023    GAP Visit #   8     Pain level:  0/10     SUBJECTIVE:  Pt states knee is doing really well. States she was cleared from doctor to ski but he does want her to wear a brace. OBJECTIVE:       Exercises/Interventions:     EXERCISE Sets/sec Reps Notes   Bike 5 min     Side steps   Red AK   Springboard lunge      Banded vertical jump 3 10    Lateral bounds - pause 2 15'    Lateral bounds - speed 2 15'    Skater jumps - pause 2 15'    Skater jumps - speed 2 15'    HS curl iso walks 3 8 30-50#; 2 angles   Broad jumps over felice 4 5    BlazePod lateral felice react      Fwd heel tap w/ heel elevated  3 8-10 6 in   Lateral heel tap   6 in   Goblet squat   Blue KB; heel elevated   RDL   Blue KBs   Glute side step 2 15' Blue  AK   Leg press ecc   95#   Wall sit - staggered stance/SL      Seated SLR+      Ecc  leg extension    35#   Spring board leg ext   Black springs   RFESS pulses 3 fatigue Staying between 70-20 deg knee flexion for increased TUT   HS alt leg iso   30#       Charges:  [] GAP EVAL  [x] GAP GROUP          ASSESSMENT:  Excellent tolerance to treatment. Continue through plyo training. Pt to continue to benefit from skilled PT to improve single leg strength and endurance, agility, and full return to sport.  Encouraged Pt to

## 2024-03-25 ENCOUNTER — HOSPITAL ENCOUNTER (OUTPATIENT)
Dept: PHYSICAL THERAPY | Age: 44
Setting detail: THERAPIES SERIES
Discharge: HOME | End: 2024-03-25

## (undated) DEVICE — SNARES COLD OVAL 10MM THIN

## (undated) DEVICE — FORCEPS BX 240CM 2.4MM L NDL RAD JAW 4 M00513334